# Patient Record
(demographics unavailable — no encounter records)

---

## 2017-09-29 NOTE — PD
HPI


Chief Complaint:  Bite or Sting


Time Seen by Provider:  15:34


Travel History


International Travel<30 days:  No


Contact w/Intl Traveler<30days:  No


Traveled to known affect area:  No





History of Present Illness


HPI


Patient is 60 years old.  He's had pain and swelling and redness about the nose 

and face for 3 days.  He's not sure if it might of been a spider bite or 

different insect bite.  Pain is constant.  Wearing glasses has been painful.  He

's tried ice but it has not helped.  He reports the upper lip feels swollen.  

He has no swelling in the back of the throat.  Onset gradual.  Timing constant.

  No Fever.





PFSH


Past Medical History


Cardiovascular Problems:  Yes


Diabetes:  Yes





Social History


Tobacco Use:  No





Allergies-Medications


(Allergen,Severity, Reaction):  


Coded Allergies:  


     No Known Allergies (Unverified , 9/29/17)


Reported Meds & Prescriptions





Reported Meds & Active Scripts


Active


Lortab (Hydrocodone-Acetaminophen) 5-325 Mg Tab 1-2 Tab PO Q6H PRN


Clindamycin (Clindamycin HCl) 150 Mg Cap 450 Mg PO Q8HR 10 Days








Review of Systems


Except as stated in HPI:  all other systems reviewed are Neg





Physical Exam


Narrative


GENERAL: Well-nourished well-developed 60-year-old male no acute distress


SKIN: Focused skin assessment warm/dry.


HEAD: Atraumatic. Normocephalic. 


EYES: Pupils equal and round. No scleral icterus. No injection or drainage. 


ENT: No nasal bleeding or discharge.  Mucous membranes pink and moist.  There 

is generalized erythema swelling and induration and tenderness overlying the 

nose with some extension to the maxillary face and the left side.  There is no 

proptosis or pain with range of motion of the eyes.  There is a minute at 

ulceration overlying the anterior and inferior margin of the left nostril.  

There is no fluctuance.


NECK: Trachea midline. No JVD. 


CARDIOVASCULAR: Regular rate and rhythm.  No murmur appreciated.


RESPIRATORY: No accessory muscle use. Clear to auscultation. Breath sounds 

equal bilaterally. 


GASTROINTESTINAL: Abdomen soft, non-tender, nondistended. Hepatic and splenic 

margins not palpable. 


MUSCULOSKELETAL: No obvious deformities. No clubbing.  No cyanosis.  No edema. 


NEUROLOGICAL: Awake and alert. No obvious cranial nerve deficits.  Motor 

grossly within normal limits. Normal speech.


PSYCHIATRIC: Appropriate mood and affect; insight and judgment normal.





Data


Data


Last Documented VS





Vital Signs








  Date Time  Temp Pulse Resp B/P (MAP) Pulse Ox O2 Delivery O2 Flow Rate FiO2


 


9/29/17 16:20        


 


9/29/17 14:23 98.0 110 18  95 Room Air  








Orders





 Orders


Clindamycin (Cleocin) (9/29/17 16:00)


Acetamin-Hydrocod 325-5 Mg (Norco  5-325 (9/29/17 16:00)








MDM


Medical Decision Making


Medical Screen Exam Complete:  Yes


Emergency Medical Condition:  Yes


Medical Record Reviewed:  Yes


Differential Diagnosis


Cellulitis, insect bite, abscess


Narrative Course


Overall there appears to be an infected insect bite involving the nose and face


We will provide clindamycin prescription


Toxic envenomation with a brown recluse is possibility however considered much 

less likely.


Return precautions were discussed.  Patient ready for discharge





Diagnosis





 Primary Impression:  


 Facial cellulitis


Referrals:  


Primary Care Physician


2 days





***Additional Instructions:  


You have a choice when it comes to health care, and we are glad that you chose 

[facility]. Hopefully, we have met your expectations on today's visit.  You 

are welcome to return to [facility] at any time, as we are committed to 

meeting the health care needs of our community.


***Med/Other Pt SpecificInfo:  Prescription(s) given


Scripts


Hydrocodone-Acetaminophen (Lortab) 5-325 Mg Tab


1-2 TAB PO Q6H Y for PAIN SCALE 6 TO 10, #20 TAB 0 Refills


   Prov: Siva Paris MD         9/29/17 


Clindamycin (Clindamycin) 150 Mg Cap


450 MG PO Q8HR for Infection for 10 Days, CAP 0 Refills


   Prov: Siva Paris MD         9/29/17


Disposition:  01 DISCHARGE HOME


Condition:  Stable











Siva Paris MD Sep 29, 2017 15:56

## 2018-01-05 NOTE — RADRPT
EXAM DATE/TIME:  01/05/2018 16:03 

 

[facility] COMPARISON:     

No previous studies available for comparison.

 

                     

INDICATIONS :     

Evaluate for foreign body, stepped on nail.

                     

 

MEDICAL HISTORY :     

None.          

 

SURGICAL HISTORY :     

None.   

 

ENCOUNTER:     

Initial                                        

 

ACUITY:     

3 days      

 

PAIN SCORE:     

9/10

 

LOCATION:     

Left  foot, second digit

 

FINDINGS:     

Three view examination of the left foot demonstrates no soft tissue swelling, dislocation, or fractur
e.   The tarsal bones appear intact.  The interphalangeal and metatarsophalangeal joints are intact. 
 The calcaneus is intact.  Bony mineralization is normal.

 

CONCLUSION:     No radiopaque foreign body.  Extensive vascular digital artery calcifications.  

 

 

 Fernie Hardy MD FACR on January 05, 2018 at 16:20           

Board Certified Radiologist.

 This report was verified electronically.

## 2018-01-05 NOTE — PD
HPI


Chief Complaint:  Skin Problem


Time Seen by Provider:  15:49


Travel History


International Travel<30 days:  No


Contact w/Intl Traveler<30days:  No


Traveled to known affect area:  No





History of Present Illness


HPI


60-year-old male with history of insulin-dependent diabetes presents for 

evaluation of left foot redness, pain.  He reports that 4 days ago he stepped 

on the nail and the nail went through his shoes.  He sustained a puncture wound 

on the plantar aspect left second toe.  He reports that since then he has had 

progressive increasing redness, pain in left foot.  He was seen at an urgent 

care center yesterday where he was given a tetanus vaccination and started on 

Bactrim.  His symptoms have since progressed and he was seen today by his 

primary care physician, Dr. Leon in [place], who sent him here for further 

evaluation.  Pain is throbbing, constant, worse when standing.  Denies fevers, 

chills, nausea, vomiting.  No other complaints.





PFSH


Past Medical History


Cardiovascular Problems:  Yes (stents)


Diabetes:  Yes


Diminished Hearing:  No


Hypertension:  Yes





Past Surgical History


Appendectomy:  Yes





Social History


Alcohol Use:  No


Tobacco Use:  No


Substance Use:  No





Allergies-Medications


(Allergen,Severity, Reaction):  


Coded Allergies:  


     No Known Allergies (Unverified  Adverse Reaction, Unknown, 1/5/18)


Reported Meds & Prescriptions





Reported Meds & Active Scripts


Active


Reported


Lisinopril 5 Mg Tab 5 Mg PO DAILY


Gabapentin 600 Mg Tab 600 Mg PO TID


Metformin (Metformin HCl) 1,000 Mg Tab 1,000 Mg PO BIDPC


Lantus Inj (Insulin Glargine) 1,000 Unit/10 Ml Vial 40 Units SQ HS








Review of Systems


Except as stated in HPI:  all other systems reviewed are Neg





Physical Exam


Narrative


GENERAL: Well-nourished male in no acute distress


SKIN: Warm and dry.  Area of excoriation and puncture wound on the plantar 

aspect left second toe.  There is some purulent drainage when palpated.  Wound 

culture performed.  There is erythema along the dorsal surface of the left foot 

calf region.  No inguinal lymphadenopathy.


HEAD: Atraumatic. Normocephalic. 


EYES: Pupils equal and round. No scleral icterus. No injection or drainage. 


ENT: No nasal bleeding or discharge.  Mucous membranes pink and moist.


NECK: Trachea midline. No JVD. 


CARDIOVASCULAR: Regular rate and rhythm.  No murmur appreciated.


RESPIRATORY: No accessory muscle use. Clear to auscultation. Breath sounds 

equal bilaterally. 


GASTROINTESTINAL: Abdomen soft, non-tender, nondistended. Hepatic and splenic 

margins not palpable. 


MUSCULOSKELETAL: No obvious deformities. No clubbing.  No cyanosis.  No edema. 


NEUROLOGICAL: Awake and alert. No obvious cranial nerve deficits.  Motor 

grossly within normal limits. Normal speech.


PSYCHIATRIC: Appropriate mood and affect; insight and judgment normal.





Data


Data


Last Documented VS





Vital Signs








  Date Time  Temp Pulse Resp B/P (MAP) Pulse Ox O2 Delivery O2 Flow Rate FiO2


 


1/5/18 16:35 98.9 92 17 178/92 (120) 100 Room Air  








Orders





 Orders


Foot, Complete ([id number]) (1/5/18 )


Wound Culture And Gram Stain (1/5/18 15:53)


Basic Metabolic Panel (Bmp) (1/5/18 15:53)


Complete Blood Count With Diff (1/5/18 15:53)


Prothrombin Time / Inr (Pt) (1/5/18 15:53)


Act Partial Throm Time (Ptt) (1/5/18 15:53)


Sodium Chlor 0.9% 1000 Ml Inj (Ns 1000 M (1/5/18 15:53)


Sepsis Workup Initiated (1/5/18 )


Lactic Acid Sepsis Protocol (1/5/18 15:53)


Blood Culture (1/5/18 15:53)


Piperacil-Tazo 3.375 Gm Premix (Zosyn 3. (1/5/18 16:00)


Vancomycin Inj (Vancomycin Inj) (1/5/18 16:00)


Sodium Chlor 0.9% 1000 Ml Inj (Ns 1000 M (1/5/18 17:12)


Insulin Human Regular Inj (Novolin R Inj (1/5/18 17:15)





Labs





Laboratory Tests








Test


  1/5/18


16:00


 


White Blood Count 14.0 TH/MM3 


 


Red Blood Count 4.62 MIL/MM3 


 


Hemoglobin 15.0 GM/DL 


 


Hematocrit 42.8 % 


 


Mean Corpuscular Volume 92.7 FL 


 


Mean Corpuscular Hemoglobin 32.5 PG 


 


Mean Corpuscular Hemoglobin


Concent 35.0 % 


 


 


Red Cell Distribution Width 12.6 % 


 


Platelet Count 227 TH/MM3 


 


Mean Platelet Volume 8.2 FL 


 


Neutrophils (%) (Auto) 73.2 % 


 


Lymphocytes (%) (Auto) 18.8 % 


 


Monocytes (%) (Auto) 7.6 % 


 


Eosinophils (%) (Auto) 0.2 % 


 


Basophils (%) (Auto) 0.2 % 


 


Neutrophils # (Auto) 10.3 TH/MM3 


 


Lymphocytes # (Auto) 2.6 TH/MM3 


 


Monocytes # (Auto) 1.1 TH/MM3 


 


Eosinophils # (Auto) 0.0 TH/MM3 


 


Basophils # (Auto) 0.0 TH/MM3 


 


CBC Comment DIFF FINAL 


 


Differential Comment  


 


Prothrombin Time 9.7 SEC 


 


Prothromb Time International


Ratio 1.0 RATIO 


 


 


Activated Partial


Thromboplast Time 27.4 SEC 


 


 


Blood Urea Nitrogen 8 MG/DL 


 


Creatinine 0.80 MG/DL 


 


Random Glucose 411 MG/DL 


 


Calcium Level 9.6 MG/DL 


 


Sodium Level 129 MEQ/L 


 


Potassium Level 4.3 MEQ/L 


 


Chloride Level 94 MEQ/L 


 


Carbon Dioxide Level 26.8 MEQ/L 


 


Anion Gap 8 MEQ/L 


 


Estimat Glomerular Filtration


Rate 99 ML/MIN 


 


 


Lactic Acid Level 2.4 mmol/L 











[facility]


Medical Decision Making


Medical Screen Exam Complete:  Yes


Emergency Medical Condition:  Yes


Medical Record Reviewed:  Yes


Differential Diagnosis


Cellulitis, lymphangitis, abscess, necrotizing fasciitis, osteomyelitis


Narrative Course


The patient has cellulitis of left foot and leg which is progressing despite 

outpatient therapy started yesterday.  The patient will be given IV vancomycin, 

Zosyn, fluids.  X-ray of left foot has been performed.  Wound culture is been 

performed.  Lab work, blood cultures have been sent.





CBC reveals WBC count of 14, BMP reveals a glucose of 411 with resultant sodium 

129.  Lactic acid is 2.4.  The patient will be given additional IV fluids, 

bolus insulin, he will be admitted.





Diagnosis





 Primary Impression:  


 Cellulitis of left foot


 Additional Impression:  


 Hyperglycemia





Admitting Information


Admitting Physician Requests:  Admit











Derrick Abad Jan 5, 2018 15:59

## 2018-01-05 NOTE — HHI.HP
__________________________________________________





H[facility]


[facility]


[facility]


Primary [facility] Physician


No Primary Care Physician


Admission Diagnosis





cellulitis left foot, hyperglycemia


Diagnoses:  


(1) Cellulitis of left foot


Diagnosis:  Principal





(2) Diabetes


Diagnosis:  Principal





(3) HTN (hypertension)


Diagnosis:  Secondary





Chief Complaint:  


Stepped on a nail, left foot infection.


Travel History


International Travel<30 Days:  No


Contact w/Intl Traveler <30 Da:  No


Traveled to Known Affected Are:  No





Sepsis Criteria


SIRS Criteria (2 or more):  Heart rate over 90, WBC > 37040, < 4000 or > 10% 

bands


Sepsis Criteria (SIRS+source):  Infect source susp/known


Septic Shock Criteria:  Lactic acid >=4


Criteria Outcome:  Meets SIRS criteria, Meets sepsis criteria


History of Present Illness


60-year-old  male with past medical history significant for HTN, DM, 

neuropathy, and MI presents to the emergency department after being instructed 

by his PCP following left foot injury.  Patient reports that Tuesday while at 

work tried to take off his shoe and noticed he was having trouble to remove it, 

he then noticed blood on the sock and after inspecting his foot noticed an 

injury to his toe. He reports cleaning it with alcohol and then left a trip to 

[place]. He returned the next day as he was worried about his foot.  He visited 

an urgent care center yesterday where he was administered a tetanus shot, as 

well as another IM antibiotic which he is unsure of. He was prescribed Bactrim 

and Keflex with orders for imaging as out patient.  He reports taking the 

antibiotics this morning around 4 AM which made him feel nauseous, no vomiting. 

Earlier today he visited his PCP and was instructed to come to ED ASAP or 

evaluation. He endorses  headaches today, denies fevers, chills, vomiting, 

diarrhea. Denies SOB, cough, denies any calf pain. Reports pain on left foot 

toe which radiates up the foot. Rates pain 8/10, moving the foot will make pain 

worse.





Review of Systems


Constitutional:  COMPLAINS OF: Weight loss, DENIES: Diaphoretic episodes, 

Fatigue, Fever, Weight gain, Chills


Endocrine:  DENIES: Heat/cold intolerance, Polydipsia, Polyuria, Polyphagia


Eyes:  DENIES: Blurred vision, Diplopia, Eye inflammation, Eye pain, Vision loss


Ears, nose, mouth, throat:  DENIES: Tinnitus, Hearing loss, Vertigo, Nasal 

discharge


Respiratory:  DENIES: Apneas, Cough, Snoring, Wheezing, Hemoptysis


Cardiovascular:  DENIES: Chest pain, Palpitations, Syncope, Dyspnea on Exertion


Gastrointestinal:  COMPLAINS OF: Nausea, DENIES: Abdominal pain, Black stools, 

Bloody stools


Musculoskeletal:  DENIES: Joint pain, Stiffness


Integumentary:  COMPLAINS OF: Abnormal pigmentation, Rash, DENIES: Nail changes

, Pruritus


Hematologic/lymphatic:  DENIES: Bruising, Lymphadenopathy


Immunologic/allergic:  DENIES: Eczema, Urticaria


Neurologic:  DENIES: Abnormal gait, Headache, Localized weakness, Paresthesias, 

Seizures, Speech Problems


Psychiatric:  DENIES: Anxiety, Confusion, Mood changes, Depression, Delusions


Except as stated in HPI:  all other systems reviewed are Neg





Past Family Social History


Past Medical History


DM II


Neuropathy


HTN


GERD


MI with stenting X2, one in [place] and one in [facility] around 


Past Surgical History


Right leg knee fracture with reconstruction


Left knee meniscal removal


Appendectomy


Left shoulder surgery


Reported Medications





Reported Meds & Active Scripts


Active


Reported


Lisinopril 5 Mg Tab 5 Mg PO DAILY


Gabapentin 600 Mg Tab 600 Mg PO TID


Metformin (Metformin HCl) 1,000 Mg Tab 1,000 Mg PO BIDPC


Lantus Inj (Insulin Glargine) 1,000 Unit/10 Ml Vial 40 Units SQ HS


Allergies:  


Coded Allergies:  


     No Known Allergies (Unverified  Allergy, Unknown, 18)


Active Ordered Medications





Current Medications


Sodium Chloride 1,000 ml @  1,000 mls/hr Q1H IV  Last administered on 18at 

17:25;  Start 18 at 15:53;  Stop 18 at 16:52;  Status DC


Piperacillin Sod/ Tazobactam Sod 50 ml @  100 mls/hr ONCE  ONCE IV  Last 

administered on 18at 16:23;  Start 18 at 16:00;  Stop 18 at 16:29;  

Status DC


Vancomycin HCl 1000 mg/Sodium Chloride 250 ml @  250 mls/hr ONCE  ONCE IV  Last 

administered on 18at 16:34;  Start 18 at 16:00;  Stop 18 at 16:59;  

Status DC


Sodium Chloride 1,000 ml @  1,000 mls/hr Q1H IV  Last administered on 18at 

17:25;  Start 18 at 17:12;  Stop 18 at 18:11;  Status DC


Insulin Human Regular (NovoLIN R INJ) 10 units ONCE  ONCE IV PUSH  Last 

administered on 18at 17:25;  Start 18 at 17:15;  Stop 18 at 17:17;  

Status DC


Sodium Chloride (NS Flush) 2 ml UNSCH  PRN IV FLUSH FLUSH AFTER USING IV ACCESS

;  Start 18 at 18:30;  Status UNV


Sodium Chloride (NS Flush) 2 ml BID IV FLUSH ;  Start 18 at 21:00;  Status 

UNV


Acetaminophen (Tylenol) 650 mg Q4H  PRN PO TEMP > 100.4;  Start 18 at 18:30

;  Status UNV


Ondansetron HCl (Zofran Inj) 4 mg Q6H  PRN IVP NAUSEA OR VOMITING;  Start  at 18:30


Acetaminophen/ Hydrocodone Bitart (Norco  5-325 Mg) 1 tab Q4H  PRN PO PAIN 

SCALE 3 TO 5;  Start 18 at 18:30;  Status UNV


Acetaminophen/ Hydrocodone Bitart (Norco  7.5-325 Mg) 1 tab Q4H  PRN PO PAIN 

SCALE 6 TO 10;  Start 18 at 18:30;  Status UNV


Morphine Sulfate (Morphine Inj) 2 mg Q3H  PRN IV PUSH Pain 3-5; if unable to 

take PO;  Start 18 at 18:30;  Status UNV


Naloxone HCl (Narcan Inj) 0.4 mg UNSCH  PRN IV PUSH SEE LABEL COMMENTS;  Start  at 18:30;  Status UNV


Senna/Docusate Sodium (Meredith-Colace) 1 tab BID PO ;  Start 18 at 21:00;  

Status UNV


Magnesium Hydroxide (Milk Of Magnesia Liq) 30 ml Q12H  PRN PO Mild constipation

;  Start 18 at 18:30;  Status UNV


Sennosides (Senokot) 17.2 mg Q12H  PRN PO Moderate constipation;  Start 18 

at 18:30;  Status UNV


Bisacodyl (Dulcolax Supp) 10 mg DAILY  PRN RECTAL SEVERE CONSITIPATION;  Start  at 18:30


Lactulose (Lactulose Liq) 30 ml DAILY  PRN PO SEVERE CONSITIPATION;  Start  at 18:30


Vancomycin HCl 1000 mg/Sodium Chloride 250 ml @  250 mls/hr Q12H IV ;  Start  at 04:30;  Status UNV


Pharmacy Profile Note 0 ml @ 0 mls/hr UNSCH OTHER ;  Start 18 at 18:45;  

Status UNV


Piperacillin Sod/ Tazobactam Sod 50 ml @  100 mls/hr Q12H IV ;  Start 18 at 

04:00


Gabapentin (Neurontin) 600 mg TID PO ;  Start 18 at 09:00;  Status UNV


Lisinopril (Prinivil) 5 mg DAILY PO ;  Start 18 at 09:00;  Status UNV


Insulin Detemir (Levemir Inj) 30 units HS SQ ;  Start 18 at 21:00;  Status 

UNV


Insulin Aspart (NovoLOG SUPPLEMENTAL SCALE) 1 ACHS SLIDING  SCALE SQ ;  Start 18 at 21:00;  Status UNV


Piperacillin Sod/ Tazobactam Sod 50 ml @  100 mls/hr Q12H IV ;  Start 18 at 

04:00;  Stop 18 at 04:00;  Status DC


Piperacillin Sod/ Tazobactam Sod 50 ml @  100 mls/hr Q12H IV ;  Start 18 at 

22:00;  Status UNV


Family History


Father:MI


Mother: Stroke


Social History


Tobacco: quit 7 years ago, use to smoke 1/3 PPD on and off


Alcohol: socially


Illicit drug use: denies





Physical Exam


Vital Signs





Vital Signs








  Date Time  Temp Pulse Resp B/P (MAP) Pulse Ox O2 Delivery O2 Flow Rate FiO2


 


18 16:35 98.9 92 17 178/92 (120) 100 Room Air  


 


18 14:46 97.9 100 20 235/102 (146) 94 Room Air  








Physical Exam


GENERAL: This is a well-nourished, well-developed patient, in no apparent 

distress.


SKIN: No rashes. Cool and dry.  Left foot second toe with plantar aspect wound, 

ecchymotic area on lateral second toe.  Foot with edema noted, redness along 

the dorsal aspect of foot, erythematous streaking up left inner leg.


HEAD: Atraumatic. Normocephalic. No temporal or scalp tenderness.


EYES: Pupils equal round and reactive. Extraocular motions intact. No scleral 

icterus. No injection or drainage. 


ENT: Nose without bleeding, purulent drainage or septal hematoma. Throat 

without erythema. Uvula midline. Airway patent.


NECK: Trachea midline. Supple, nontender, no meningeal signs.


CARDIOVASCULAR: Regular rate and rhythm without murmurs, gallops, or rubs. 


RESPIRATORY: Clear to auscultation. Breath sounds equal bilaterally. No wheezes

, rales, or rhonchi.  


GASTROINTESTINAL: Abdomen soft, non-tender, nondistended. No palpable masses. 

No guarding.


MUSCULOSKELETAL: Left foot with edema and erythema, redness.  No cyanosis. No 

joint tenderness, effusion noted. No calf tenderness. 


NEUROLOGICAL: Awake and alert. Cranial nerves II through XII intact.  Motor and 

sensory grossly within normal limits. Five out of 5 muscle strength in all 

muscle groups.  Normal speech.


Laboratory





Laboratory Tests








Test


  18


16:00


 


White Blood Count 14.0 


 


Red Blood Count 4.62 


 


Hemoglobin 15.0 


 


Hematocrit 42.8 


 


Mean Corpuscular Volume 92.7 


 


Mean Corpuscular Hemoglobin 32.5 


 


Mean Corpuscular Hemoglobin


Concent 35.0 


 


 


Red Cell Distribution Width 12.6 


 


Platelet Count 227 


 


Mean Platelet Volume 8.2 


 


Neutrophils (%) (Auto) 73.2 


 


Lymphocytes (%) (Auto) 18.8 


 


Monocytes (%) (Auto) 7.6 


 


Eosinophils (%) (Auto) 0.2 


 


Basophils (%) (Auto) 0.2 


 


Neutrophils # (Auto) 10.3 


 


Lymphocytes # (Auto) 2.6 


 


Monocytes # (Auto) 1.1 


 


Eosinophils # (Auto) 0.0 


 


Basophils # (Auto) 0.0 


 


CBC Comment DIFF FINAL 


 


Differential Comment  


 


Prothrombin Time 9.7 


 


Prothromb Time International


Ratio 1.0 


 


 


Activated Partial


Thromboplast Time 27.4 


 


 


Blood Urea Nitrogen 8 


 


Creatinine 0.80 


 


Random Glucose 411 


 


Calcium Level 9.6 


 


Sodium Level 129 


 


Potassium Level 4.3 


 


Chloride Level 94 


 


Carbon Dioxide Level 26.8 


 


Anion Gap 8 


 


Estimat Glomerular Filtration


Rate 99 


 


 


Lactic Acid Level 2.4 














 Date/Time


Source Procedure


Growth Status


 


 


 18 16:10


Blood Peripheral Aerobic Blood Culture


Pending Received


 


 18 16:10


Blood Peripheral Anaerobic Blood Culture


Pending Received





 18 16:00


Wound Foot Gram Stain


Pending Received


 


 18 16:00


Wound Foot Wound Culture


Pending Received








Result Diagram:  


18 1600                                                                    

            18 1600





Imaging





Last Impressions








Foot X-Ray 18 0000 Signed





Impressions: 





 Service Date/Time:  2018 16:03 - CONCLUSION: No radiopaque 





 foreign body.  Extensive vascular digital artery calcifications.      Fernie Hardy MD  FACR











Caprini VTE Risk Assessment


Caprini VTE Risk Assessment:  No/Low Risk (score <= 1)


Caprini Risk Assessment Model











 Point Value = 1          Point Value = 2  Point Value = 3  Point Value = 5


 


Age 41-60


Minor surgery


BMI > 25 kg/m2


Swollen legs


Varicose veins


Pregnancy or postpartum


History of unexplained or recurrent


   spontaneous 


Oral contraceptives or hormone


   replacement


Sepsis (< 1 month)


Serious lung disease, including


   pneumonia (< 1 month)


Abnormal pulmonary function


Acute myocardial infarction


Congestive heart failure (< 1 month)


History of inflammatory bowel disease


Medical patient at bed rest Age 61-74


Arthroscopic surgery


Major open surgery (> 45 min)


Laparoscopic surgery (> 45 min)


Malignancy


Confined to bed (> 72 hours)


Immobilizing plaster cast


Central venous access Age >= 75


History of VTE


Family history of VTE


Factor V Leiden


Prothrombin 84853L


Lupus anticoagulant


Anticardiolipin antibodies


Elevated serum homocysteine


Heparin-induced thrombocytopenia


Other congenital or acquired


   thrombophilia Stroke (< 1 month)


Elective arthroplasty


Hip, pelvis, or leg fracture


Acute spinal cord injury (< 1 month)








Prophylaxis Regimen











   Total Risk


Factor Score Risk Level Prophylaxis Regimen


 


0-1      Low Early ambulation


 


2 Moderate Order ONE of the following:


*Sequential Compression Device (SCD)


*Heparin 5000 units SQ BID


 


3-4 Higher Order ONE of the following medications:


*Heparin 5000 units SQ TID


*Enoxaparin/Lovenox 40 mg SQ daily (WT < 150 kg, CrCl > 30 mL/min)


*Enoxaparin/Lovenox 30 mg SQ daily (WT < 150 kg, CrCl > 10-29 mL/min)


*Enoxaparin/Lovenox 30 mg SQ BID (WT < 150 kg, CrCl > 30 mL/min)


AND/OR


*Sequential Compression Device (SCD)


 


5 or more Highest Order ONE of the following medications:


*Heparin 5000 units SQ TID (Preferred with Epidurals)


*Enoxaparin/Lovenox 40 mg SQ daily (WT < 150 kg, CrCl > 30 mL/min)


*Enoxaparin/Lovenox 30 mg SQ daily (WT < 150 kg, CrCl > 10-29 mL/min)


*Enoxaparin/Lovenox 30 mg SQ BID (WT < 150 kg, CrCl > 30 mL/min)


AND


*Sequential Compression Device (SCD)











Assessment and Plan


Assessment and Plan


60-year-old male with past medical history significant for  HTN, DM, neuropathy

, and MI presents to the ED following injury to the left foot after stepping on 

a nail. 








Left foot cellulitis following injury


   - WBC elevated 14.0 with neutrophils 73.2, lactic acid 2.4, tachycardic on 

admission


   - Blood cultures 2 obtained, wound culture sent, follow cultures and adjust 

antibiotics accordingly


   - Left foot x-ray performed and reviewed no radioplaque foreign body, 

extensive vascular digital total artery calcifications.


   - Patient received IV Zosyn and vancomycin while in ED


   - Received 2l NS bolus


   - Admit to inpatient with telemetry


   - Continue broad-spectrum antibiotics IV vancomycin (pharmacy consulted for 

dosing) and IV Zosyn


   - Recheck lactic acid tomorrow, recheck CBC and BMP tomorrow


   - Pain control with Norco, IV morphine for breakthrough pain





Diabetes II


   - Patient's blood sugar in , 10 units of regular insulin administered 

IV


   - Restart Levemir 30 units subcutaneous at bedtime


   - Accu-Cheks with NovoLog ISS


   - Hold off on metformin


   - Check hemoglobin A1c


   - 1800 ADA diet





Neuropathy


   - Chronic, continue patient's Neurontin 600 mg 3 times a day





Hypertension, uncontrolled


   - Initial blood pressure on admission 135/102, around 4:30 this afternoon BP 

178/92


   - Resume patient's lisinopril, continue monitoring trend








   





The exam, history, and the medical decision-making described in the above note 

were completed with the assistance of the mid-level provider. I reviewed and 

agree with the findings presented.  I attest that I had a face-to-face 

encounter with the patient on the same day, and personally performed and 

documented my assessment and findings in the medical record.


Code Status


FULL CODE


Discussed Condition With


RN AND PT AND ARNP AND ER AND FAMILY





Physician Certification


2 Midnight Certification Type:  Admission for Inpatient Services


Order for Inpatient Services


The services are ordered in accordance with Medicare regulations or non-

Medicare payer requirements, as applicable.  In the case of services not 

specified as inpatient-only, they are appropriately provided as inpatient 

services in accordance with the 2-midnight benchmark.


Estimated LOS (days):  3


 days is the estimated time the patient will need to remain in the hospital, 

assuming treatment plan goals are met and no additional complications.


Post-Hospital Plan:  Not yet determined











Rosa Shi 2018 18:08


Fernie Liu DO 2018 19:11

## 2018-01-06 NOTE — HHI.PR
Subjective


Remarks


Patient reports he is feeling better. He states the redness on his foot is 

better. He denies fevers or chills.





Objective


Vitals





Vital Signs








  Date Time  Temp Pulse Resp B/P (MAP) Pulse Ox O2 Delivery O2 Flow Rate FiO2


 


1/6/18 08:00 98.7 87 20 148/79 (102) 96   


 


1/6/18 04:42 98.4 94 18 148/94 (112) 95   


 


1/6/18 03:48  86      


 


1/6/18 03:47  70      


 


1/6/18 01:14     94   


 


1/5/18 23:43  90      


 


1/5/18 23:21 98.3 95 18 133/63 (86) 93   


 


1/5/18 21:59      Room Air  


 


1/5/18 19:52  92      


 


1/5/18 19:35 98.6 101 18 175/73 (107) 95   


 


1/5/18 16:35 98.9 92 17 178/92 (120) 100 Room Air  


 


1/5/18 14:46 97.9 100 20 235/102 (146) 94 Room Air  














I/O      


 


 1/5/18 1/5/18 1/5/18 1/6/18 1/6/18 1/6/18





 07:00 15:00 23:00 07:00 15:00 23:00


 


Intake Total   2562.5 ml 1010 ml  


 


Output Total    800 ml  


 


Balance   2562.5 ml 210 ml  


 


      


 


Intake Oral    960 ml  


 


IV Total   2562.5 ml 50 ml  


 


Output Urine Total    800 ml  


 


# Voids    1  








Result Diagram:  


1/5/18 1600                                                                    

            1/5/18 1600





Imaging





Last Impressions








Foot X-Ray 1/5/18 0000 Signed





Impressions: 





 Service Date/Time:  Friday, January 5, 2018 16:03 - CONCLUSION: No radiopaque 





 foreign body.  Extensive vascular digital artery calcifications.      Fernie Hardy MD  FACR








Objective Remarks


GENERAL: This is a well-nourished, well-developed patient, in no apparent 

distress.


CARDIOVASCULAR: Normal rate and regular rhythm without murmurs, gallops, or 

rubs. 


RESPIRATORY: Good respiratory efforts. Breath sounds equal and clear to 

auscultation bilaterally.


GASTROINTESTINAL: Abdomen soft, non-tender, non-distended. Normal active bowel 

sounds


MUSCULOSKELETAL: Left second toe has a puncture wound on the plantar aspect, 

some dry necrotic tissue. Dorsal toe with cellulitis extending to the midfoot. 


NEURO:  Alert & Oriented x4 to person, place, time, situation.  Moves all ext x4


PSYCH: Appropriate mood and affect.





A/P


Problem List:  


(1) Cellulitis of left foot


ICD Code:  L03.116 - Cellulitis of left lower limb


Status:  Acute


(2) Diabetes


ICD Code:  E11.9 - Type 2 diabetes mellitus without complications


(3) HTN (hypertension)


ICD Code:  I10 - Essential (primary) hypertension


Assessment and Plan


60-year-old male with past medical history significant for  HTN, DM, neuropathy

, and MI presents to the ED following injury to the left foot after stepping on 

a nail. 








Left foot cellulitis following puncture wound


   - WBC elevated 14.0 with neutrophils 73.2, lactic acid 2.4, tachycardic on 

admission


   - Blood cultures 2 obtained, wound culture pending, follow cultures and 

adjust antibiotics accordingly


   - Left foot x-ray performed and reviewed no radiopaque foreign body, 

extensive vascular digital total artery calcifications.


   - Continue vancomycin and Zosyn


            - Patient is a diabetic, x-ray also show small vessel 

calcifications.  He is certainly at risk for worsening infection and difficulty 

healing.  Consult podiatry for assistance.  It is unclear whether or not he 

would benefit from some debridement.  He will certainly need outpatient follow-

up.


   - Pain control with Norco, IV morphine for breakthrough pain





Diabetes II


   - Patient's blood sugar in , 10 units of regular insulin administered 

IV


   - Continue Levemir 30 units subcutaneous at bedtime


   - Accu-Cheks with NovoLog ISS


   - Hold off on metformin


   - Hemoglobin A1c of 10.4


   - 1800 ADA diet





Neuropathy


   - Chronic, continue patient's Neurontin 600 mg 3 times a day





Hypertension, uncontrolled


   -Given uncontrolled A1c, concern about compliance


   -Continue lisinopril, increase to 10 mg daily.  Continue to monitor trend





GI prophylaxis: Stool softener PRN constipation. 





DVT PPx: Davida Wheeler MD Jan 6, 2018 09:59

## 2018-01-07 NOTE — HHI.PR
Subjective


Remarks


Patient reports he is doing okay.  Still have some redness.  Pain is controlled.





Objective


Vitals





Vital Signs








  Date Time  Temp Pulse Resp B/P (MAP) Pulse Ox O2 Delivery O2 Flow Rate FiO2


 


1/7/18 12:00 98.4 90 20 169/82 (111) 94   


 


1/7/18 08:00      Room Air  


 


1/7/18 08:00 98.0 80 20 133/74 (93) 97   


 


1/7/18 04:00  80      


 


1/7/18 04:00 98.5 86 17 125/92 (103) 95   


 


1/7/18 00:00 98.6 87 17 127/90 (102) 96   


 


1/7/18 00:00      Room Air  


 


1/7/18 00:00  75      


 


1/6/18 21:00      Room Air  


 


1/6/18 20:00 98.0 86 17 127/95 (106) 95   


 


1/6/18 20:00  85      


 


1/6/18 16:00 97.8 87 20 148/78 (101) 96   


 


1/6/18 15:58  87      














I/O      


 


 1/6/18 1/6/18 1/6/18 1/7/18 1/7/18 1/7/18





 07:00 15:00 23:00 07:00 15:00 23:00


 


Intake Total 1010 ml  720 ml 240 ml  


 


Output Total 800 ml  300 ml   


 


Balance 210 ml  420 ml 240 ml  


 


      


 


Intake Oral 960 ml  720 ml 240 ml  


 


IV Total 50 ml     


 


Output Urine Total 800 ml  300 ml   


 


# Voids 1  4 1  


 


# Bowel Movements    0  








Result Diagram:  


1/7/18 0640                                                                    

            1/7/18 0540





Objective Remarks


GENERAL: This is a well-nourished, well-developed patient, in no apparent 

distress.


CARDIOVASCULAR: Normal rate and regular rhythm without murmurs, gallops, or 

rubs. 


RESPIRATORY: Good respiratory efforts. Breath sounds equal and clear to 

auscultation bilaterally.


GASTROINTESTINAL: Abdomen soft, non-tender, non-distended. Normal active bowel 

sounds


MUSCULOSKELETAL: Left second toe has a puncture wound on the plantar aspect, 

some dry necrotic tissue. Dorsal toe blister with cellulitis extending to the 

midfoot. 


NEURO:  Alert & Oriented x4 to person, place, time, situation.  Moves all ext x4


PSYCH: Appropriate mood and affect.





A/P


Problem List:  


(1) Cellulitis of left foot


ICD Code:  L03.116 - Cellulitis of left lower limb


Status:  Acute


(2) Diabetes


ICD Code:  E11.9 - Type 2 diabetes mellitus without complications


(3) HTN (hypertension)


ICD Code:  I10 - Essential (primary) hypertension


Assessment and Plan


60-year-old male with past medical history significant for  HTN, DM, neuropathy

, and MI presents to the ED following injury to the left foot after stepping on 

a nail. 








Left foot cellulitis following puncture wound


   - WBC elevated 14.0 with neutrophils 73.2, lactic acid 2.4, tachycardic on 

admission


   - Blood cultures 2 obtained, wound culture pending, follow cultures and 

adjust antibiotics accordingly


   - Left foot x-ray performed and reviewed no radiopaque foreign body, 

extensive vascular digital total artery calcifications.


   - Continue vancomycin and Zosyn


            - Patient is a diabetic, x-ray also show small vessel 

calcifications.  He is certainly at risk for worsening infection and difficulty 

healing.  


            - Appreciate podiatry consult, plan for bedside debridement 

tomorrow.  Continue IV antibiotics today


   - Pain control with Norco, IV morphine for breakthrough pain





Diabetes II


   - Patient's blood sugar in , 10 units of regular insulin administered 

IV


   - Continue Levemir 30 units subcutaneous at bedtime


   - Accu-Cheks with NovoLog ISS


   - Hold off on metformin


   - Hemoglobin A1c of 10.4


   - 1800 ADA diet





Neuropathy


   - Chronic, continue patient's Neurontin 600 mg 3 times a day





Hypertension, uncontrolled


   -Given uncontrolled A1c, concern about compliance


   -Continue lisinopril, increase to 10 mg daily.  Continue to monitor trend





GI prophylaxis: Stool softener PRN constipation. 





DVT PPx: Lovenox


Discharge Planning


Pending improvement.  Continue IV antibiotics.  Bedside debridement planned 

tomorrow per podiatry.











Davida Meneses MD Jan 7, 2018 13:43

## 2018-01-07 NOTE — MB
cc:

AMARJIT FAITH LAURA

****

 

YOB: 1957

 

DATE OF CONSULTATION:  1/7/2018

 

CHIEF COMPLAINT

Left foot cellulitis and ulceration

 

HISTORY OF PRESENT ILLNESS

Mr. Faria is a pleasant 60 year-old  male with a  significant

history of diabetes and neuropathy.  The patient states that Tuesday, while at

work, he stepped on a nail at a work site.  He had to leave on a business trip

to [place], so he cleansed and bandaged it himself.  He returned from his trip

early as he had concerns over developing infection of the foot.  He saw an

urgent care doctor where he received a tetanus shot and intramuscular

antibiotic injection.   He is unsure of what it was.  He then saw his family

doctor who prescribed Bactrim and Keflex but ultimately he ended up encouraging

him to come to the emergency department for further evaluation.

 

The patient states that the foot is feeling better after being on IV

antibiotics for 24 hours and he feels that it was better, but he does continue

to have some mild discomfort.

 

PAST MEDICAL HISTORY

1. Type 2 diabetes.

2. Neuropathy.

3. Hypertension.

4. GERD.

5. Myocardial infarction with two stents.

 

PAST SURGICAL HISTORY

1. Right/left knee fracture.

2. Left knee meniscal removal.

3. Appendectomy.

4. Left shoulder surgery.

 

MEDICATIONS

Please see list.

 

ALLERGIES

NO KNOWN DRUG ALLERGIES.

 

FAMILY HISTORY

Noncontributory.

 

SOCIAL HISTORY

The patient quit smoking seven years ago.  He used to smoke 1/3 pack per day.

He drinks alcohol socially.  Lives at home with family.  Denies any illicit

drug abuse.

 

VITAL SIGNS: Temperature is 98.0 with a T-max of 98.7.  Pulse 80, respiratory

rate 20, blood pressure 133/74, pulse ox 97% O2 on room air.

 

LABORATORY DATA

White count is 9.0, down from 14.4, hemoglobin 13.1, hematocrit 36.6, platelet

count 238.  INR 1.0.  Sodium 134, potassium 3.6, chloride 99, carbon dioxide

29.0, BUN 16, creatinine 0.49, hemoglobin A1c 10.4.

 

IMAGING STUDIES

X-rays negative for any gas in the soft tissue, signs of cortical erosion or

foreign body.  There are some noted calcifications at the dorsal forefoot.

 

PHYSICAL EXAMINATION

The patient has palpable DP and PT pulses.  Capillary refill is less than 3

seconds.  Gross sensation is diminished.  The right foot is unremarkable.  The

left foot has a plantar ulceration which appears approximately 5 millimeters x

5 millimeters.  Depth is unknown at this time.  On the lateral aspect of the

second digit, there is a bulla which is approximately 1 cm x 0.5 cm.  There is

erythema circumferentially around the digit, extending to the dorsal mid foot

with warmth noted.  No drainage, no malodor.  Active range of motion to the

digits is within normal limits.

 

ASSESSMENT AND PLAN:

1. Left foot second digit stage II ulceration with cellulitis which is

improving.

2. Plan for bedside debridement tomorrow with Dr. Ray.  Nurses were

provided with instrumentation orders and consent orders.

3. Continue IV antibiotics.

4. White count continues to trend down.  Cellulitis may be a strep variety and

may take longer than average for the redness to resolve.

5. Surgical shoe ordered.  The patient can weightbear as tolerated in the

surgical shoe.

6. The patient has [employer].  While my group does accept his insurance,

I would be happy to see him as an outpatient.  I am requesting case management

help to obtain an authorization from his family doctor to avoid delay in care

at the time of discharge.

 

Thank you for the consultation.

 

 

                              _________________________________

                              Hilda MARTINEZ

D:  1/7/2018/11:03 AM

T:  1/7/2018/11:44 AM

Visit #:  [id number]

Job #:  [id number]

## 2018-01-08 NOTE — HHI.PR
Subjective


Remarks


Patient reports is feeling okay.  Foot redness is about the same.  No fevers or 

chills.





Objective


Vitals





Vital Signs








  Date Time  Temp Pulse Resp B/P (MAP) Pulse Ox O2 Delivery O2 Flow Rate FiO2


 


1/8/18 12:00 98.5 79 20 156/73 (100) 94   


 


1/8/18 09:50     95   


 


1/8/18 08:00  76      


 


1/8/18 08:00      Room Air  


 


1/8/18 08:00 98.7 84 18 146/71 (96) 95   


 


1/8/18 06:15    138/70 (92)    


 


1/8/18 04:00 98.8 88 20 162/81 (108) 93   


 


1/8/18 03:40  75      


 


1/8/18 00:00 98.0 85 20 128/71 (90) 94   


 


1/7/18 23:37  84      


 


1/7/18 20:50      Room Air  


 


1/7/18 20:00  87      


 


1/7/18 20:00 99.0 88 17 135/63 (87) 97   


 


1/7/18 16:00 97.9 87 20 146/76 (99) 95   


 


1/7/18 15:42  83      














I/O      


 


 1/7/18 1/7/18 1/7/18 1/8/18 1/8/18 1/8/18





 07:00 15:00 23:00 07:00 15:00 23:00


 


Intake Total 240 ml  1607.5 ml 907.5 ml  


 


Output Total    500 ml  


 


Balance 240 ml  1607.5 ml 407.5 ml  


 


      


 


Intake Oral 240 ml  1040 ml 340 ml  


 


IV Total   567.5 ml 567.5 ml  


 


Output Urine Total    500 ml  


 


# Voids 1  4   


 


# Bowel Movements 0  1 0  








Result Diagram:  


1/7/18 0640                                                                    

            1/7/18 0540





Objective Remarks


GENERAL: This is a well-nourished, well-developed patient, in no apparent 

distress.


CARDIOVASCULAR: Normal rate and regular rhythm without murmurs, gallops, or 

rubs. 


RESPIRATORY: Good respiratory efforts. Breath sounds equal and clear to 

auscultation bilaterally.


GASTROINTESTINAL: Abdomen soft, non-tender, non-distended. Normal active bowel 

sounds


MUSCULOSKELETAL: Left second toe has a puncture wound on the plantar aspect, 

some dry necrotic tissue. Dorsal toe blister with cellulitis extending to the 

midfoot. 


NEURO:  Alert & Oriented x4 to person, place, time, situation.  Moves all ext x4


PSYCH: Appropriate mood and affect.





A/P


Problem List:  


(1) Cellulitis of left foot


ICD Code:  L03.116 - Cellulitis of left lower limb


Status:  Acute


(2) Diabetes


ICD Code:  E11.9 - Type 2 diabetes mellitus without complications


(3) HTN (hypertension)


ICD Code:  I10 - Essential (primary) hypertension


Assessment and Plan


60-year-old male with past medical history significant for  HTN, DM, neuropathy

, and MI presents to the ED following injury to the left foot after stepping on 

a nail. 








Left foot cellulitis following puncture wound


   - WBC elevated 14.0 with neutrophils 73.2, lactic acid 2.4, tachycardic on 

admission


   - Blood cultures 2 obtained, wound culture pending, follow cultures and 

adjust antibiotics accordingly


   - Left foot x-ray performed and reviewed no radiopaque foreign body, 

extensive vascular digital total artery calcifications.


   - Continue vancomycin and Zosyn


            - Patient is a diabetic, x-ray also show small vessel 

calcifications.  He is certainly at risk for worsening infection and difficulty 

healing.  


            - Appreciate podiatry consult, plan for bedside debridement today.  

Continue IV antibiotics today


   - Pain control with Norco, IV morphine for breakthrough pain





Diabetes II


   - Patient's blood sugar in , 10 units of regular insulin administered 

IV


   - Continue Levemir 30 units subcutaneous at bedtime


   - Accu-Cheks with NovoLog ISS


   - Hold off on metformin


   - Hemoglobin A1c of 10.4


   - 1800 ADA diet





Neuropathy


   - Chronic, continue patient's Neurontin 600 mg 3 times a day





Hypertension, uncontrolled


   -Given uncontrolled A1c, concern about compliance


   -Continue lisinopril, increase to 10 mg daily.  Continue to monitor trend





GI prophylaxis: Stool softener PRN constipation. 





DVT PPx: Lovenox


Discharge Planning


Pending improvement.  Continue IV antibiotics.  Bedside debridement planned for 

today per podiatry.











Davida Meneses MD Jan 8, 2018 13:35

## 2018-01-08 NOTE — HHI.PR
Subjective


Remarks


Patient seen bedside for left foot incision and drainage. Patient states he was 

at the landfill/[place] throwing away things and he stepped on a nail. He did not 

feel the nail go through his foot 2/2 neuropathy. Patient then proceeded on a 

trip to [place] for work and he noticed his foot progressively get worse. 

Patient denies N,V,F,Ch. He states he has mild left foot pain that is well 

controlled.





Objective





Vital Signs








  Date Time  Temp Pulse Resp B/P (MAP) Pulse Ox O2 Delivery O2 Flow Rate FiO2


 


1/8/18 16:00 97.7 88 22 163/83 (109) 96   


 


1/8/18 12:00 98.5 79 20 156/73 (100) 94   


 


1/8/18 10:00   20     


 


1/8/18 09:50     95   


 


1/8/18 08:00  76      


 


1/8/18 08:00      Room Air  


 


1/8/18 08:00 98.7 84 18 146/71 (96) 95   


 


1/8/18 06:15    138/70 (92)    


 


1/8/18 04:00 98.8 88 20 162/81 (108) 93   


 


1/8/18 03:40  75      


 


1/8/18 00:00 98.0 85 20 128/71 (90) 94   


 


1/7/18 23:37  84      


 


1/7/18 20:50      Room Air  


 


1/7/18 20:00  87      


 


1/7/18 20:00 99.0 88 17 135/63 (87) 97   














I/O      


 


 1/7/18 1/7/18 1/7/18 1/8/18 1/8/18 1/8/18





 07:00 15:00 23:00 07:00 15:00 23:00


 


Intake Total 240 ml  1607.5 ml 907.5 ml  720 ml


 


Output Total    500 ml  


 


Balance 240 ml  1607.5 ml 407.5 ml  720 ml


 


      


 


Intake Oral 240 ml  1040 ml 340 ml  720 ml


 


IV Total   567.5 ml 567.5 ml  


 


Output Urine Total    500 ml  


 


# Voids 1  4   3


 


# Bowel Movements 0  1 0  








Result Diagram:  


1/7/18 0640                                                                    

            1/7/18 0540





Imaging





Last Impressions








Foot X-Ray 1/5/18 0000 Signed





Impressions: 





 Service Date/Time:  Friday, January 5, 2018 16:03 - CONCLUSION: No radiopaque 





 foreign body.  Extensive vascular digital artery calcifications.      Fernie Hardy MD  FA[facility]








Procedures


Bedside Incision and drainage performed 01/08/18


Other Results





 Laboratory Tests








Test


  1/7/18


05:40 1/7/18


06:40 1/7/18


09:50


 


Blood Urea Nitrogen 16 MG/DL   


 


Creatinine 0.49 MG/DL   


 


Random Glucose 126 MG/DL   


 


Calcium Level 8.4 MG/DL   


 


Sodium Level 134 MEQ/L   


 


Potassium Level 3.6 MEQ/L   


 


Chloride Level 99 MEQ/L   


 


Carbon Dioxide Level 29.0 MEQ/L   


 


Anion Gap 6 MEQ/L   


 


Estimat Glomerular Filtration


Rate 174 ML/MIN 


  


  


 


 


White Blood Count  9.0 TH/MM3  


 


Red Blood Count  3.97 MIL/MM3  


 


Hemoglobin  13.1 GM/DL  


 


Hematocrit  36.6 %  


 


Mean Corpuscular Volume  92.2 FL  


 


Mean Corpuscular Hemoglobin  32.9 PG  


 


Mean Corpuscular Hemoglobin


Concent 


  35.7 % 


  


 


 


Red Cell Distribution Width  12.6 %  


 


Platelet Count  238 TH/MM3  


 


Mean Platelet Volume  8.3 FL  


 


Vancomycin Level Trough   5.2 MCG/ML 








Objective Remarks


Left foot physical exam: 





Vasc: DP/PT non palpable 2/2 edema to left foot. CRT under 3 secs to digits x5 

left foot. 


Neuro: Gross sensation intact to left foot. No hyperalgesia noted. Pin point 

sensation decreased to left foot. 


Derm: Erythema noted ascending to left midfoot. Erythema noted to left second 

digit. Plantar second digit proximal phalanx ulcer with eschar present to wound 

bed. Upon debridement and incision and drainage noted purulent drainage total 

1cc with exposed tendon and probe to bone. Sanginous blister noted to dorsal 

lateral aspect of left second digit. Upon lancing of blister no purulent 

drainage noted. 


MSK: Hallux valgus noted to left foot, decrease in medial longitudinal arch, 

hammertoe noted to left second digit. Tenderness on palpation of left foot at 

MPJ.


Medications and IVs





Current Medications








 Medications


  (Trade)  Dose


 Ordered  Sig/Gianfranco


 Route  Start Time


 Stop Time Status Last Admin


 


  (NS Flush)  2 ml  UNSCH  PRN


 IV FLUSH  1/5/18 18:30


     


 


 


  (NS Flush)  2 ml  BID


 IV FLUSH  1/5/18 21:00


    1/8/18 08:46


 


 


  (Tylenol)  650 mg  Q4H  PRN


 PO  1/5/18 18:30


     


 


 


  (Zofran Inj)  4 mg  Q6H  PRN


 IVP  1/5/18 18:30


     


 


 


  (Norco  5-325 Mg)  1 tab  Q4H  PRN


 PO  1/5/18 18:30


     


 


 


  (Norco  7.5-325


 Mg)  1 tab  Q4H  PRN


 PO  1/5/18 18:30


    1/8/18 08:52


 


 


  (Morphine Inj)  2 mg  Q3H  PRN


 IV PUSH  1/5/18 18:30


     


 


 


  (Narcan Inj)  0.4 mg  UNSCH  PRN


 IV PUSH  1/5/18 18:30


     


 


 


  (Meredith-Colace)  1 tab  BID


 PO  1/5/18 21:00


    1/8/18 08:46


 


 


  (Milk Of


 Magnesia Liq)  30 ml  Q12H  PRN


 PO  1/5/18 18:30


    1/7/18 17:08


 


 


  (Senokot)  17.2 mg  Q12H  PRN


 PO  1/5/18 18:30


     


 


 


  (Dulcolax Supp)  10 mg  DAILY  PRN


 RECTAL  1/5/18 18:30


     


 


 


  (Lactulose Liq)  30 ml  DAILY  PRN


 PO  1/5/18 18:30


     


 


 


 Pharmacy Profile


 Note  0 ml @ 0


 mls/hr  UNSCH


 OTHER  1/5/18 18:45


     


 


 


 Piperacillin Sod/


 Tazobactam Sod  50 ml @ 


 100 mls/hr  Q12H


 IV  1/6/18 04:00


    1/8/18 03:52


 


 


  (Neurontin)  600 mg  TID


 PO  1/6/18 09:00


    1/8/18 17:36


 


 


  (Levemir Inj)  30 units  HS


 SQ  1/5/18 21:00


    1/7/18 21:03


 


 


  (NovoLOG


 SUPPLEMENTAL


 SCALE)  1  ACHS SLIDING  SCALE


 SQ  1/5/18 21:00


    1/8/18 17:00


 


 


  (Lovenox Inj)  40 mg  Q24H


 SQ  1/5/18 21:00


    1/7/18 20:59


 


 


  (Narcan Inj)  0.4 mg  UNSCH  PRN


 IV PUSH  1/5/18 19:15


     


 


 


  (D50w (Vial) Inj)  50 ml  UNSCH  PRN


 IV PUSH  1/5/18 19:45


     


 


 


  (Glucagon Inj)  1 mg  UNSCH  PRN


 OTHER  1/5/18 19:45


     


 


 


  (Prinivil)  10 mg  DAILY


 PO  1/7/18 09:00


    1/8/18 08:46


 


 


  (Silvadene 1%


 Cream (50 Gm))  1 applic  DAILY


 TOPICAL  1/8/18 07:00


     


 


 


 Vancomycin HCl


 1750 mg/Sodium


 Chloride  517.5 ml @ 


 250 mls/hr  Q12H


 IV  1/7/18 14:00


    1/8/18 13:52


 


 


 Miscellaneous


 Information  SPECIFIC LAB TO BE


 DRAWN:VANCOMYCIN


 TROUGH DATE TO...  ONCE  ONCE


 .XX  1/9/18 01:45


 1/9/18 01:46   


 











Assessment and Plan


Assessment and Plan


60 year old male with left second digit infection, left foot infection 2/2 stab 

wound from nail 





Patient examined and evaluated with all questions answered 


Consent signed, witnessed for left foot second digit incision and drainage 


Time out performed via hospital protocol 


Nurse bedside for part of procedure 





Procedure Note


Left foot prepped and draped in the usual sterile fashion. Betadine utilized 

for prep. 15 blade utilized to perform incision and drainage to left foot 

second digit at plantar proximal phalanx,with incision made done to 

subcutaneous tissue. 1cc purulent drainage expressed. Tendon and probe to bone 

noted. Tendon dystrophic. No purulent drainage upon continued compression along 

course of 2nd FDL or plantar aspect of metatarsal heads. Site was copiously 

irrigated with normal saline. Silvadene, 4x4s, lanny, and ace applied to left 

foot. Deep culture swab performed 





Anticipated DC per podiatry in 2 days if improvement noted with continued IV 

abx therapy and s/p bedside I&D


Will re evaluate patient tomorrow


Continue IV abx therapy











Emily Ray DPM Jan 8, 2018 19:34

## 2018-01-09 NOTE — HHI.PR
Subjective


Remarks


Patient reports is feeling okay.  Pain is controlled.  Afebrile.





Objective


Vitals





Vital Signs








  Date Time  Temp Pulse Resp B/P (MAP) Pulse Ox O2 Delivery O2 Flow Rate FiO2


 


1/9/18 08:12  76      


 


1/9/18 08:00      Room Air  


 


1/9/18 08:00 98.0 83 20 146/83 (104) 95   


 


1/9/18 04:22  78      


 


1/9/18 04:00 98.5 74 16 140/67 (91) 96   


 


1/9/18 00:00 99.5 80 18 156/78 (104) 96   


 


1/8/18 23:56  84      


 


1/8/18 20:26  85      


 


1/8/18 20:00 99.5 86 18 189/90 (123) 95   


 


1/8/18 19:00      Room Air  


 


1/8/18 16:06  77      


 


1/8/18 16:00 97.7 88 22 163/83 (109) 96   


 


1/8/18 12:00 98.5 79 20 156/73 (100) 94   














I/O      


 


 1/8/18 1/8/18 1/8/18 1/9/18 1/9/18 1/9/18





 07:00 15:00 23:00 07:00 15:00 23:00


 


Intake Total 907.5 ml  720 ml 240 ml  


 


Output Total 500 ml     


 


Balance 407.5 ml  720 ml 240 ml  


 


      


 


Intake Oral 340 ml  720 ml 240 ml  


 


IV Total 567.5 ml     


 


Output Urine Total 500 ml     


 


# Voids   3 2  


 


# Bowel Movements 0   2  








Result Diagram:  


1/7/18 0640                                                                    

            1/9/18 0200





Objective Remarks


GENERAL: This is a well-nourished, well-developed patient, in no apparent 

distress.


CARDIOVASCULAR: Normal rate and regular rhythm without murmurs, gallops, or 

rubs. 


RESPIRATORY: Good respiratory efforts. Breath sounds equal and clear to 

auscultation bilaterally.


GASTROINTESTINAL: Abdomen soft, non-tender, non-distended. Normal active bowel 

sounds


MUSCULOSKELETAL: Left foot with postop shoe. Dressing appear clean. Chronic 

diminished sensations at the toes otherwise appear pink.


NEURO:  Alert & Oriented x4 to person, place, time, situation.  Moves all ext x4


PSYCH: Appropriate mood and affect.





A/P


Problem List:  


(1) Cellulitis of left foot


ICD Code:  L03.116 - Cellulitis of left lower limb


Status:  Acute


(2) Diabetes


ICD Code:  E11.9 - Type 2 diabetes mellitus without complications


(3) HTN (hypertension)


ICD Code:  I10 - Essential (primary) hypertension


Assessment and Plan


60-year-old male with past medical history significant for  HTN, DM, neuropathy

, and MI presents to the ED following injury to the left foot after stepping on 

a nail. 








Left foot cellulitis following puncture wound


   - WBC elevated 14.0 with neutrophils 73.2, lactic acid 2.4, tachycardic on 

admission


   - Blood cultures 2 obtained, wound cultures growing group D enterococcus 

and strep, continue Zosyn.  Discontinue vancomycin


   - Left foot x-ray performed and reviewed no radiopaque foreign body, 

extensive vascular digital total artery calcifications.


            - Patient is a diabetic, x-ray also show small vessel 

calcifications.  He is certainly at risk for worsening infection and difficulty 

healing.  


            - Appreciate podiatry consult, status post bedside debridement on 1/ 8/18.


   - Continue IV antibiotics today per podiatry recommendations, Pain control 

with Norco, IV morphine for breakthrough pain





Diabetes II


   - Patient's blood sugar in , 10 units of regular insulin administered 

IV


   - Continue Levemir 30 units subcutaneous at bedtime


   - Accu-Cheks with NovoLog ISS


   - Hold off on metformin


   - Hemoglobin A1c of 10.4


   - 1800 ADA diet





Neuropathy


   - Chronic, continue patient's Neurontin 600 mg 3 times a day





Hypertension, uncontrolled


   -Given uncontrolled A1c, concern about compliance


   -Continue lisinopril, increase to 10 mg daily.  Continue to monitor trend





GI prophylaxis: Stool softener PRN constipation. 





DVT PPx: Lovenox


Discharge Planning


Pending improvement.  Continue IV antibiotics today. DC home when cleared by 

Podiatry.











Davida Meneses MD Jan 9, 2018 11:56

## 2018-01-09 NOTE — HHI.PR
Subjective


Remarks


Patient seen bedside this evening. States his appetite in intact and unchanged. 

Denies any infectious symptoms N,V,F,Ch. States the pain in his foot is 

resolving although he occasionally gets shooting and throbbing pain.





Objective





Vital Signs








  Date Time  Temp Pulse Resp B/P (MAP) Pulse Ox O2 Delivery O2 Flow Rate FiO2


 


1/9/18 17:15     97   21


 


1/9/18 16:01 98.0 84 18 187/91 (123) 97   


 


1/9/18 15:48  77      


 


1/9/18 12:00 98.7 65 20 136/68 (90) 95   


 


1/9/18 09:30   20     


 


1/9/18 08:12  76      


 


1/9/18 08:00      Room Air  


 


1/9/18 08:00 98.0 83 20 146/83 (104) 95   


 


1/9/18 04:22  78      


 


1/9/18 04:00 98.5 74 16 140/67 (91) 96   


 


1/9/18 00:00 99.5 80 18 156/78 (104) 96   


 


1/8/18 23:56  84      














I/O      


 


 1/8/18 1/8/18 1/8/18 1/9/18 1/9/18 1/9/18





 07:00 15:00 23:00 07:00 15:00 23:00


 


Intake Total 907.5 ml  720 ml 807 ml 720 ml 720 ml


 


Output Total 500 ml     


 


Balance 407.5 ml  720 ml 807 ml 720 ml 720 ml


 


      


 


Intake Oral 340 ml  720 ml 240 ml 720 ml 720 ml


 


IV Total 567.5 ml   567 ml  


 


Output Urine Total 500 ml     


 


# Voids   3 2 3 7


 


# Bowel Movements 0   2 1 0








Result Diagram:  


1/7/18 0640                                                                    

            1/9/18 0200





Procedures


Bedside Incision and drainage performed 01/08/18


Other Results





 Laboratory Tests








Test


  1/9/18


02:00


 


Creatinine 0.53 MG/DL 


 


Estimat Glomerular Filtration


Rate 159 ML/MIN 


 


 


Vancomycin Level Trough 6.8 MCG/ML 








Objective Remarks


Left foot physical exam: 





Vasc: DP/PT 2/4 left foot. CRT under 3 secs to digits x5 left foot. 


Neuro: Gross sensation intact to left foot. No hyperalgesia noted. Pin point 

sensation decreased to left foot. 


Derm: Erythema noted ascending to left midfoot resolving and receding. Erythema 

noted to left second digit. Plantar second digit proximal phalanx ulcer with 

eschar granular wound bed and exposed tendon. Deep tissue injury noted to 

dorsal lateral aspect of left second digit. 


MSK: Hallux valgus noted to left foot, decrease in medial longitudinal arch, 

hammertoe noted to left second digit. Tenderness on palpation of left foot at 

MPJ.


Medications and IVs





Current Medications








 Medications


  (Trade)  Dose


 Ordered  Sig/Gianfranco


 Route  Start Time


 Stop Time Status Last Admin


 


  (NS Flush)  2 ml  UNSCH  PRN


 IV FLUSH  1/5/18 18:30


     


 


 


  (NS Flush)  2 ml  BID


 IV FLUSH  1/5/18 21:00


    1/9/18 08:25


 


 


  (Tylenol)  650 mg  Q4H  PRN


 PO  1/5/18 18:30


     


 


 


  (Zofran Inj)  4 mg  Q6H  PRN


 IVP  1/5/18 18:30


     


 


 


  (Norco  5-325 Mg)  1 tab  Q4H  PRN


 PO  1/5/18 18:30


     


 


 


  (Norco  7.5-325


 Mg)  1 tab  Q4H  PRN


 PO  1/5/18 18:30


    1/9/18 08:30


 


 


  (Morphine Inj)  2 mg  Q3H  PRN


 IV PUSH  1/5/18 18:30


     


 


 


  (Narcan Inj)  0.4 mg  UNSCH  PRN


 IV PUSH  1/5/18 18:30


     


 


 


  (Meredith-Colace)  1 tab  BID


 PO  1/5/18 21:00


    1/9/18 08:24


 


 


  (Milk Of


 Magnesia Liq)  30 ml  Q12H  PRN


 PO  1/5/18 18:30


    1/7/18 17:08


 


 


  (Senokot)  17.2 mg  Q12H  PRN


 PO  1/5/18 18:30


     


 


 


  (Dulcolax Supp)  10 mg  DAILY  PRN


 RECTAL  1/5/18 18:30


     


 


 


  (Lactulose Liq)  30 ml  DAILY  PRN


 PO  1/5/18 18:30


     


 


 


 Piperacillin Sod/


 Tazobactam Sod  50 ml @ 


 100 mls/hr  Q12H


 IV  1/6/18 04:00


    1/9/18 16:01


 


 


  (Neurontin)  600 mg  TID


 PO  1/6/18 09:00


    1/9/18 18:04


 


 


  (Levemir Inj)  30 units  HS


 SQ  1/5/18 21:00


    1/8/18 20:26


 


 


  (NovoLOG


 SUPPLEMENTAL


 SCALE)  1  ACHS SLIDING  SCALE


 SQ  1/5/18 21:00


    1/9/18 16:04


 


 


  (Lovenox Inj)  40 mg  Q24H


 SQ  1/5/18 21:00


    1/8/18 20:26


 


 


  (Narcan Inj)  0.4 mg  UNSCH  PRN


 IV PUSH  1/5/18 19:15


     


 


 


  (D50w (Vial) Inj)  50 ml  UNSCH  PRN


 IV PUSH  1/5/18 19:45


     


 


 


  (Glucagon Inj)  1 mg  UNSCH  PRN


 OTHER  1/5/18 19:45


     


 


 


  (Prinivil)  10 mg  DAILY


 PO  1/7/18 09:00


    1/9/18 08:24


 


 


  (Silvadene 1%


 Cream (50 Gm))  1 applic  DAILY


 TOPICAL  1/8/18 07:00


     


 











Assessment and Plan


Assessment and Plan


60 year old male with left second digit infection, left foot infection 2/2 stab 

wound from nail 





Patient examined and evaluated with all questions answered 


Improvement noted to left foot however ascending erythema still present to left 

foot although receding 


Continue IV abx therapy 


Will re evaluate tomorrow


Cx 1/5 growing Strep/Enterococcus 


Deep Cx 1/8  from bedside incision and drainage pending 


ID recommendations for outpatient oral vs. IV abx therapy











Emily Ray DPM Jan 9, 2018 22:00

## 2018-01-10 NOTE — HHI.DS
__________________________________________________





Discharge Summary


Admission Date


Jan 5, 2018 at 17:48


Discharge Date:  Mathew 10, 2018


Admitting Diagnosis





cellulitis left foot, hyperglycemia





(1) Cellulitis of left foot


ICD Code:  L03.116 - Cellulitis of left lower limb


Diagnosis:  Principal


Status:  Acute


(2) Diabetes


ICD Code:  E11.9 - Type 2 diabetes mellitus without complications


Diagnosis:  Principal





(3) HTN (hypertension)


ICD Code:  I10 - Essential (primary) hypertension


Diagnosis:  Principal





(4) Hyperglycemia


ICD Code:  R73.9 - Hyperglycemia, unspecified


Diagnosis:  Principal


Status:  Acute


Brief History - From Admission


60-year-old  male with past medical history significant for HTN, DM, 

neuropathy, and MI presents to the emergency department after being instructed 

by his PCP following left foot injury.  Patient reports that Tuesday while at 

work tried to take off his shoe and noticed he was having trouble to remove it, 

he then noticed blood on the sock and after inspecting his foot noticed an 

injury to his toe. He reports cleaning it with alcohol and then left a trip to 

[place]. He returned the next day as he was worried about his foot.  He visited 

an urgent care center yesterday where he was administered a tetanus shot, as 

well as another IM antibiotic which he is unsure of. He was prescribed Bactrim 

and Keflex with orders for imaging as out patient.  He reports taking the 

antibiotics this morning around 4 AM which made him feel nauseous, no vomiting. 

Earlier today he visited his PCP and was instructed to come to ED ASAP or 

evaluation. He endorses  headaches today, denies fevers, chills, vomiting, 

diarrhea. Denies SOB, cough, denies any calf pain. Reports pain on left foot 

toe which radiates up the foot. Rates pain 8/10, moving the foot will make pain 

worse.


CBC/BMP:  


1/7/18 0640                                                                    

            1/9/18 0200





Significant Findings





Laboratory Tests








Test


  1/9/18


02:00


 


Creatinine


  0.53 MG/DL


(0.60-1.30)








Imaging





Last Impressions








Foot X-Ray 1/5/18 0000 Signed





Impressions: 





 Service Date/Time:  Friday, January 5, 2018 16:03 - CONCLUSION: No radiopaque 





 foreign body.  Extensive vascular digital artery calcifications.      Fernie Hardy MD  FACR








PE at Discharge


GENERAL: This is a well-nourished, well-developed patient, in no apparent 

distress.


CARDIOVASCULAR: Normal rate and regular rhythm without murmurs, gallops, or 

rubs. 


RESPIRATORY: Good respiratory efforts. Breath sounds equal and clear to 

auscultation bilaterally.


GASTROINTESTINAL: Abdomen soft, non-tender, non-distended. Normal active bowel 

sounds


MUSCULOSKELETAL: Left foot with postop shoe. Dressing appear clean. Chronic 

diminished sensations at the toes otherwise appear pink.


NEURO:  Alert & Oriented x4 to person, place, time, situation.  Moves all ext x4


PSYCH: Appropriate mood and affect.


Pt Condition on Discharge:  Stable


Discharge Disposition:  Discharge Home


Discharge Time:  > 30 minutes


Discharge Instructions


DIET: Follow Instructions for:  Diabetic Diet


Activities you can perform:  Weight Bearing as Jamie


Follow up Referrals:  


Podiatry - 3-5 Days @ [facility] with Hilda Villagomez DPM





New Medications:  


Insulin Aspart Inj (Novolog Inj) 1,000 Unit/10 Ml Vial


2-12 UNITS SQ ACHS for Blood Sugar Management, #10 ML 0 Refills


Max dose at bedtime ( 8) units; sugars less than 70,(0) units; sugars


 150-199,(2) units; sugars 200-249,(4) units; sugars 250-299,(7) units;


 sugars 300-349,(10) units; sugars greater than 349,(12)units


Ampicillin (Ampicillin Trihydrate) 500 Mg Capsule


500 MG PO Q8H for Infection, #30 TAB-CAP





Lisinopril (Lisinopril) 10 Mg Tab


10 MG PO DAILY for Blood Pressure Management, #31 TAB





 


Continued Medications:  


Gabapentin (Gabapentin) 600 Mg Tab


600 MG PO TID, #90 TAB 0 Refills





Insulin Glargine Inj (Lantus Inj) 1,000 Unit/10 Ml Vial


40 UNITS SQ HS for Blood Sugar Management, #1 VIAL 0 Refills (This prescription 

has been renewed)





Metformin (Metformin) 1,000 Mg Tab


1000 MG PO BIDPC for Blood Sugar Management, #60 TAB 0 Refills





 


Discontinued Medications:  


Lisinopril (Lisinopril) 5 Mg Tab


5 MG PO DAILY for Blood Pressure Management, #30 TAB 0 Refills

















Schmitz Montiel,Tunde MD Mathew 10, 2018 17:52

## 2018-01-10 NOTE — MB
cc:

ADELA ALVARADO MD, FRANKLYN F. MD

****

 

 

DATE OF CONSULTATION

1/10/2018

 

REQUESTING PHYSICIAN

Dr. Schmitz.

 

REASON FOR CONSULTATION

Recommendations for outpatient antibiotics.

 

HISTORY OF PRESENT ILLNESS

This is a 60-year-old  male who stepped on a nail one week ago.  The

patient subsequently developed cellulitis and then had spread of erythema of

his left leg.  He was admitted to the hospital and was evaluated by podiatry

who did bedside debridement of the wound.  Before that he was on Keflex and

Bactrim prescribed by his family doctor.  An x-ray was performed and it showed

no erosions.  The patient is diabetic.  Culture from the wound on 1/5/2018 had

Enterococcus faecalis and group-B beta strep.  His white count on admission was

14.0 and on 1/7/2018 it was down to 9.0.  The patient has been afebrile.  He

denies nausea, vomiting, abdominal pain, shortness of breath, chills, dysuria

or other symptoms.  The redness of the foot has improved.  He still, however,

has redness at the toe and there is an ulcerated area from the debridement

procedures performed.  There were two bedside debridements performed.

 

PAST MEDICAL/SURGICAL HISTORY

1. Diabetes mellitus.

2. Diabetic neuropathy.

3. Hypertension.

4. Gastroesophageal reflux disease.

5. History of myocardial infarction.

6. History of myocardial stents.

7. Appendectomy.

8. Left shoulder surgery.

9. Left knee surgery.

10.Right knee reconstruction from a fracture.

 

ALLERGIES

No known drug allergies.

 

MEDICATIONS

1. Neurontin.

2. Prinivil.

3. Piperacillin/tazobactam.

4. Meredith-Colace.

5. Insulin.

6. Norco 7.5.

 

SOCIAL HISTORY

No tobacco.  No alcohol.  No illicit drugs.

 

FAMILY HISTORY

Noncontributory.

 

REVIEW OF SYSTEMS

Negative on 10-point review.

 

PHYSICAL EXAMINATION

GENERAL:  This is a well-developed male who is in no acute distress.  He is

awake, alert and oriented.

VITAL SIGNS:  Temperature 98.2, /89, respirations 17, heart rate 78.

HEENT:  The head is atraumatic.  Extraocular movements grossly intact.  Pupils

reactive to light.  No icterus.  Oropharynx has moist mucosa, no lesions.

NECK:  Supple.  No adenopathy.

LUNGS:  Clear.

HEART:  Regular rate and rhythm without murmurs.

ABDOMEN:  Bowel sounds present.  Soft, nontender.

RECTAL:  Not performed.

EXTREMITIES:  No clubbing.  No cyanosis.  The left second toe is erythematous

and there is ulceration at the inner aspect near the distal phalangeal joint.

There is mild redness of the dorsum of the left foot and no swelling.  There is

some bloody serous drainage on the dressing.

NEUROLOGIC:  Nonfocal.

PSYCH:  Patient calm and cooperative.

 

LABORATORY

WBC 9.0, platelets 238.  Estimated .

 

IMPRESSION

Wound infection of the left second toe with cellulitis.  This followed a nail

puncture injury.  The culture grew out Enterococcus faecalis and group-B beta

strep.

 

RECOMMENDATIONS

Continue to treat the patient with p.o. ampicillin 500 t.i.d. for 10 days.  The

patient can follow-up with podiatry upon discharge.

 

Thank you for this consultation.

 

 

 

                              _________________________________

                              En Crane MD FD/NAYAN

D:  1/10/2018/2:25 PM

T:  1/10/2018/2:59 PM

Visit #:  [id number]

Job #:  [id number]

## 2018-01-10 NOTE — HHI.IDPN
Note


Infectious Disease Note


Patient seen and examined.


Full consult dictated.





Cellulitis and wound infection of the left 2nd toe without osteomyelitis.


Enterococcus and group B strep. 





Recommend :





PO Ampicillin x 10 days. 


Okay to discharge from my standpoint. 














En Crane MD Mathew 10, 2018 14:36

## 2018-01-10 NOTE — HHI.PR
Subjective


Remarks


Patient seen bedside this evening.  Discussed possible discharge.  Improvement 

noted to the left lower extremity.  Denies any nausea vomiting fevers or chills.





Objective





Vital Signs








  Date Time  Temp Pulse Resp B/P (MAP) Pulse Ox O2 Delivery O2 Flow Rate FiO2


 


1/10/18 18:40  68      


 


1/10/18 16:01 98.3 69 18 148/83 (104) 96   


 


1/10/18 12:11 98.2 78 17 155/89 (111) 98   


 


1/10/18 12:00  69      


 


1/10/18 09:43   20     


 


1/10/18 08:36     96 Room Air  


 


1/10/18 08:01 98.8 70 17 161/80 (107) 96   


 


1/10/18 08:00  62      


 


1/10/18 04:00 98.5 72 18 150/74 (99) 97   


 


1/10/18 03:45  63      


 


1/10/18 00:00 97.5 72 18 115/74 (88) 98   


 


1/9/18 23:47  89      


 


1/9/18 20:00 99.2 84 18 181/89 (119) 96   


 


1/9/18 19:41  79      














I/O      


 


 1/9/18 1/9/18 1/9/18 1/10/18 1/10/18 1/10/18





 07:00 15:00 23:00 07:00 15:00 23:00


 


Intake Total 807 ml 720 ml 720 ml 480 ml  


 


Balance 807 ml 720 ml 720 ml 480 ml  


 


      


 


Intake Oral 240 ml 720 ml 720 ml 480 ml  


 


IV Total 567 ml     


 


# Voids 2 3 7 3  


 


# Bowel Movements 2 1 0 1  








Result Diagram:  


1/7/18 0640                                                                    

            1/9/18 0200





Imaging





Last Impressions








Foot X-Ray 1/5/18 0000 Signed





Impressions: 





 Service Date/Time:  Friday, January 5, 2018 16:03 - CONCLUSION: No radiopaque 





 foreign body.  Extensive vascular digital artery calcifications.      Fernie Hardy MD  FACR








Procedures


Bedside Incision and drainage performed 01/08/18


Other Results





 Laboratory Tests








Test


  1/9/18


02:00


 


Creatinine 0.53 MG/DL 


 


Estimat Glomerular Filtration


Rate 159 ML/MIN 


 


 


Vancomycin Level Trough 6.8 MCG/ML 








Objective Remarks


Left foot physical exam: 





Vasc: DP/PT 2/4 left foot. CRT under 3 secs to digits x5 left foot. 


Neuro: Gross sensation intact to left foot. No hyperalgesia noted. Pin point 

sensation decreased to left foot. 


Derm: Erythema noted ascending to left midfoot resolving and receding improved 

from 1/9. Erythema noted to left second digit. Plantar second digit proximal 

phalanx ulcer with eschar granular wound bed and exposed tendon. Deep tissue 

injury noted to dorsal lateral aspect of left second digit. 


MSK: Hallux valgus noted to left foot, decrease in medial longitudinal arch, 

hammertoe noted to left second digit. Tenderness on palpation of left foot at 

MPJ.





Assessment and Plan


Assessment and Plan


60 year old male with left second digit infection, left foot infection 2/2 stab 

wound from nail 





Patient examined and evaluated with all questions answered 


Okay to DC per podiatry


ID recommendations for oral outpatient recommendations appreciated


Improvement noted to left foot  


Patient to follow up in office with me, Dr. Ray within 1 week


Discussed appropriate dressing changes with patient to consist of Silvadene 

with dry sterile dressing to left second digit daily











Emily Ray DPM Mathwe 10, 2018 19:20

## 2018-01-10 NOTE — HHI.DCPOC
Discharge Care Plan


Diagnosis:  


(1) Hyperglycemia


(2) Cellulitis of left foot


(3) Diabetes


(4) HTN (hypertension)


Goals to Promote Your Health


* To prevent worsening of your condition and complications


* To maintain your health at the optimal level


Directions to Meet Your Goals


*** Take your medications as prescribed


*** Follow your dietary instruction


*** Follow activity as directed








*** Keep your appointments as scheduled


*** Take your immunizations and boosters as scheduled


*** If your symptoms worsen call your PCP, if no PCP go to Urgent Care Center 

or Emergency Room***


*** Smoking is Dangerous to Your Health. Avoid second hand smoke***


***Call the 24-hour hour crisis hotline for domestic abuse at [phone]***











Tunde Alvares MD Mathew 10, 2018 17:49

## 2018-01-11 NOTE — PQ
Physician Query Response Document

 

 

PATIENT:               TUSHAR GHOTRA

ACCT #:                  [id number]

MRN:                       [id number]

:                       1957

ADMIT DATE:       2018 5:48 PM

DISCH DATE:        1/10/2018 6:46 PM

RESPONDING

PROVIDER #:        [id number]

 

 

QUERY TEXT:

 

Documentation Clarification

 

Your help is requested in clarifying the following clinical documentation, if you can please further 
specify in the medical record and discharge summary.

 

Thank You!

 

The patient's Clinical Indicators include:

The H

However, this is not stated in the problem list.

 

Sepsis Criteria

SIRS Criteria (2 or more):  Heart rate over 90, WBC > 13775, < 4000 or > 10% bands

Sepsis Criteria (SIRS+source):  Infect source susp/known

Septic Shock Criteria:  Lactic acid >=4

Criteria Outcome:  Meets SIRS criteria, Meets sepsis criteria

 

Presenting clinical indicators for Sepsis are:

Heart Rate : 100

Respiratory rate:20

WBC:14.0

Lactic acid: 2.4

 

 

 

 

Query created by: Roselia Flores on 2018 12:00 AM

 

RESPONSE TEXT:

 

Appears to have meet SIRS/ SEPSIS CRITERIA ON ADMISSION FOR THE DM FOOT INFECTION

 

 

 

 

 

 

 

Electronically signed by:  Ferine Liu 2018 10:57 AM

## 2018-02-13 NOTE — HHI.HP
__________________________________________________





[facility]


Service


[facility]


Primary Care Physician


No Primary Care Physician


Admission Diagnosis





diabetic foot infection


Diagnoses:  


Chief Complaint:  


Foot infection


Travel History


International Travel<30 Days:  No


Contact w/Intl Traveler <30 Da:  No


Traveled to Known Affected Are:  No


History of Present Illness


60-year-old male with a medical history significant for hypertension, diabetes, 

peripheral neuropathy presented to the emergency room with worsening infection 

of the left second toe.  The patient was recently admitted for infection of the 

toe after he stepped on a nail.  He underwent debridement and was treated with 

antibiotics.  He reports that he was doing okay at home and a couple of days 

ago while pushing a boat he felt a pop in the wound opened up.  Since then he 

has been experiencing significant pain.  He denies any fevers or chills.  

Workup in the emergency room revealed probable osteomyelitis involving the 

second toe on the left foot.





Review of Systems


Constitutional:  DENIES: Fever, Chills


Musculoskeletal:  COMPLAINS OF: Joint pain, Stiffness, Joint Swelling


Except as stated in HPI:  all other systems reviewed are Neg





Past Family Social History


Past Medical History


DM II


Neuropathy


HTN


GERD


MI with stenting X2, one in [place] and one in [facility] around 


Past Surgical History


Left second toe debridement.


Right leg knee fracture with reconstruction


Left knee meniscal removal


Appendectomy


Left shoulder surgery


Allergies:  


Coded Allergies:  


     No Known Allergies (Unverified  Allergy, Unknown, 18)


Family History


Father has history of MI.


Social History


Patient reports he quit smoking 7 years ago.  Admits to alcohol occasionally.  

Denies illicit drug use.





Physical Exam


Vital Signs





Vital Signs








  Date Time  Temp Pulse Resp B/P (MAP) Pulse Ox O2 Delivery O2 Flow Rate FiO2


 


18 14:26  92 17 136/71 (92) 99 Room Air  


 


18 13:34   17     


 


18 10:34  86 20 142/75 (97) 98 Room Air  


 


18 09:31 98.2 82 18 190/101 (130) 98 Room Air  








Physical Exam


GENERAL: This is a well-nourished, well-developed patient, in no apparent 

distress.


SKIN: Extensive ulceration/erosion of the plantar aspect of the left second toe.


HEAD: Atraumatic. Normocephalic. No temporal or scalp tenderness.


EYES: Pupils equal round and reactive. Extraocular motions intact. No scleral 

icterus. No injection or drainage. 


ENT: Nose without bleeding, purulent drainage or septal hematoma. Throat 

without erythema, tonsillar hypertrophy or exudate. Uvula midline. Airway 

patent.


NECK: Trachea midline. No JVD or lymphadenopathy. Supple, nontender, no 

meningeal signs.


CARDIOVASCULAR: Regular rate and rhythm without murmurs, gallops, or rubs. 


RESPIRATORY: Clear to auscultation. Breath sounds equal bilaterally. No wheezes

, rales, or rhonchi.  


GASTROINTESTINAL: Abdomen soft, non-tender, nondistended. No hepato-splenomegaly

, or palpable masses. No guarding.


MUSCULOSKELETAL: Left second toe has extensive erosions/ulceration involving 

the plantar and medial aspect.  The toe is markedly swollen.  Apparent pocket 

of pus.


NEUROLOGICAL: Awake and alert. Cranial nerves II through XII intact.  Motor and 

sensory grossly within normal limits. Five out of 5 muscle strength in all 

muscle groups.  Normal speech.


Laboratory





Laboratory Tests








Test


  18


10:34


 


White Blood Count 9.7 


 


Red Blood Count 4.47 


 


Hemoglobin 14.6 


 


Hematocrit 40.8 


 


Mean Corpuscular Volume 91.1 


 


Mean Corpuscular Hemoglobin 32.6 


 


Mean Corpuscular Hemoglobin


Concent 35.8 


 


 


Red Cell Distribution Width 12.7 


 


Platelet Count 266 


 


Mean Platelet Volume 7.3 


 


Neutrophils (%) (Auto) 57.4 


 


Lymphocytes (%) (Auto) 33.3 


 


Monocytes (%) (Auto) 7.2 


 


Eosinophils (%) (Auto) 1.5 


 


Basophils (%) (Auto) 0.6 


 


Neutrophils # (Auto) 5.6 


 


Lymphocytes # (Auto) 3.2 


 


Monocytes # (Auto) 0.7 


 


Eosinophils # (Auto) 0.1 


 


Basophils # (Auto) 0.1 


 


CBC Comment DIFF FINAL 


 


Differential Comment  


 


Prothrombin Time 9.6 


 


Prothromb Time International


Ratio 0.9 


 


 


Activated Partial


Thromboplast Time 27.8 


 


 


Blood Urea Nitrogen 13 


 


Creatinine 0.73 


 


Random Glucose 301 


 


Calcium Level 9.0 


 


Sodium Level 131 


 


Potassium Level 4.7 


 


Chloride Level 95 


 


Carbon Dioxide Level 28.3 


 


Anion Gap 8 


 


Estimat Glomerular Filtration


Rate 109 


 








Result Diagram:  


18 1034                                                                   

             18 1034





Imaging





Last Impressions








Foot X-Ray 18 0000 Signed





Impressions: 





 Service Date/Time:  2018 10:22 - CONCLUSION:  1. Concern 





 for osteomyelitis with possible fracture involving the head of the proximal 





 phalanx of the second toe. No radiopaque foreign body.     Thomas Green Jr., MD Caprini VTE Risk Assessment


Caprini VTE Risk Assessment:  Mod/High Risk (score >= 2)


Caprini Risk Assessment Model











 Point Value = 1          Point Value = 2  Point Value = 3  Point Value = 5


 


Age 41-60


Minor surgery


BMI > 25 kg/m2


Swollen legs


Varicose veins


Pregnancy or postpartum


History of unexplained or recurrent


   spontaneous 


Oral contraceptives or hormone


   replacement


Sepsis (< 1 month)


Serious lung disease, including


   pneumonia (< 1 month)


Abnormal pulmonary function


Acute myocardial infarction


Congestive heart failure (< 1 month)


History of inflammatory bowel disease


Medical patient at bed rest Age 61-74


Arthroscopic surgery


Major open surgery (> 45 min)


Laparoscopic surgery (> 45 min)


Malignancy


Confined to bed (> 72 hours)


Immobilizing plaster cast


Central venous access Age >= 75


History of VTE


Family history of VTE


Factor V Leiden


Prothrombin 10681D


Lupus anticoagulant


Anticardiolipin antibodies


Elevated serum homocysteine


Heparin-induced thrombocytopenia


Other congenital or acquired


   thrombophilia Stroke (< 1 month)


Elective arthroplasty


Hip, pelvis, or leg fracture


Acute spinal cord injury (< 1 month)








Prophylaxis Regimen











   Total Risk


Factor Score Risk Level Prophylaxis Regimen


 


0-1      Low Early ambulation


 


2 Moderate Order ONE of the following:


*Sequential Compression Device (SCD)


*Heparin 5000 units SQ BID


 


3-4 Higher Order ONE of the following medications:


*Heparin 5000 units SQ TID


*Enoxaparin/Lovenox 40 mg SQ daily (WT < 150 kg, CrCl > 30 mL/min)


*Enoxaparin/Lovenox 30 mg SQ daily (WT < 150 kg, CrCl > 10-29 mL/min)


*Enoxaparin/Lovenox 30 mg SQ BID (WT < 150 kg, CrCl > 30 mL/min)


AND/OR


*Sequential Compression Device (SCD)


 


5 or more Highest Order ONE of the following medications:


*Heparin 5000 units SQ TID (Preferred with Epidurals)


*Enoxaparin/Lovenox 40 mg SQ daily (WT < 150 kg, CrCl > 30 mL/min)


*Enoxaparin/Lovenox 30 mg SQ daily (WT < 150 kg, CrCl > 10-29 mL/min)


*Enoxaparin/Lovenox 30 mg SQ BID (WT < 150 kg, CrCl > 30 mL/min)


AND


*Sequential Compression Device (SCD)











Assessment and Plan


Assessment and Plan


60-year-old male with past medical history significant for  HTN, DM, neuropathy

, and MI presents to the ED with worsening infection of the left second toe.





Diabetic foot infection/osteomyelitis left second toe:


-Will need to be debrided.  Consult podiatry for assistance


- Consult infectious disease for assistance with antibiotics management.  

Patient received vancomycin and Zosyn in the ED.


-Pain control as needed.





Diabetes II


- Levemir 30 units subcutaneous at bedtime


- Accu-Cheks with NovoLog ISS


- Hold off on metformin


- Hemoglobin A1c of 10.4 at the last admission.


- 1800 ADA diet





Neuropathy


   - Chronic, continue patient's Neurontin 600 mg 3 times a day





Hypertension, uncontrolled


-Continue lisinopril.  Continue to monitor trend





GI prophylaxis: Stool softener PRN constipation. 





DVT PPx: Heparin


Code Status


Full


Discussed Condition With


ER physician, Dr. Almeida.





Physician Certification


2 Midnight Certification Type:  Admission for Inpatient Services


Order for Inpatient Services


The services are ordered in accordance with Medicare regulations or non-

Medicare payer requirements, as applicable.  In the case of services not 

specified as inpatient-only, they are appropriately provided as inpatient 

services in accordance with the 2-midnight benchmark.


Estimated LOS (days):  3


 days is the estimated time the patient will need to remain in the hospital, 

assuming treatment plan goals are met and no additional complications.


Post-Hospital Plan:  Home











Davida Meneses MD 2018 15:44

## 2018-02-13 NOTE — PD
HPI


Chief Complaint:  Skin Problem


Time Seen by Provider:  09:53


Travel History


International Travel<30 days:  No


Contact w/Intl Traveler<30days:  No


Traveled to known affect area:  No





History of Present Illness


HPI


This patient complains of recurrence of his diabetic foot infection.  He was 

hospitalized previously for infection the left second toe.  He initially 

stepped on a nail and that psych at injured and then infected.  He had podiatry 

debridement in the hospital twice.  Recently has flared up worse.  He denies 

fever.  He was moderately severe.  He has diabetic neuropathy but still 

complaining of some pain there.  No alleviating factors.  No exacerbating 

factors.  Duration 2 weeks





PFSH


Past Medical History


Cancer:  No


Cardiovascular Problems:  Yes


Diabetes:  Yes


Patient Takes Glucophage:  Yes


Diminished Hearing:  No


Genitourinary:  No


Hypertension:  Yes


Immune Disorder:  No


Implanted Vascular Access Dvce:  Yes


Musculoskeletal:  No


Neurologic:  No


Psychiatric:  No


Reproductive:  No


Respiratory:  No


Pregnant?:  Not Pregnant





Past Surgical History


Appendectomy:  Yes


Body Medical Devices:  STENTS


Cardiac Surgery:  Yes (STENTS PLACED, CARDIAC CATH)


Other Surgery:  Yes





Social History


Alcohol Use:  No


Tobacco Use:  No


Substance Use:  No





Allergies-Medications


(Allergen,Severity, Reaction):  


Coded Allergies:  


     No Known Allergies (Unverified  Allergy, Unknown, 1/5/18)


Reported Meds & Prescriptions





Reported Meds & Active Scripts


Active


Novolog Inj (Insulin Aspart) 1,000 Unit/10 Ml Vial 2-12 Units SQ ACHS


     Max dose at bedtime ( 8) units; sugars less than 70,(0) units; sugars


     150-199,(2) units; sugars 200-249,(4) units; sugars 250-299,(7) units;


     sugars 300-349,(10) units; sugars greater than 349,(12)units


Lisinopril 10 Mg Tab 10 Mg PO DAILY


Lantus Inj (Insulin Glargine) 1,000 Unit/10 Ml Vial 40 Units SQ HS


Reported


Gabapentin 600 Mg Tab 600 Mg PO TID


Metformin (Metformin HCl) 1,000 Mg Tab 1,000 Mg PO BIDPC








Review of Systems


General / Constitutional:  No: Fever


Eyes:  No: Visual changes


HENT:  No: Headaches


Cardiovascular:  No: Chest Pain or Discomfort


Respiratory:  No: Shortness of Breath


Gastrointestinal:  No: Abdominal Pain


Genitourinary:  No: Dysuria


Musculoskeletal:  Positive: Pain


Skin:  No Rash


Neurologic:  No: Weakness


Psychiatric:  No: Depression


Endocrine:  No: Polydipsia


Hematologic/Lymphatic:  No: Easy Bruising





Physical Exam


Narrative


GENERAL: Well-nourished, well-developed patient with left foot infection .


SKIN: Focused skin assessment reveals no rash and nodules. Skin is Warm and dry.


HEAD: Atraumatic. Normocephalic. 


EYES: Pupils equal and round. No scleral icterus. No injection or drainage. 


ENT: No nasal bleeding or discharge.  Mucous membranes pink and moist.


NECK: Trachea midline. No JVD. 


CARDIOVASCULAR: Regular rate and rhythm.  No murmur appreciated.


RESPIRATORY: No accessory muscle use. Clear to auscultation. Breath sounds 

equal bilaterally. 


GASTROINTESTINAL: Abdomen soft, non-tender, nondistended. Hepatic and splenic 

margins not palpable. 


MUSCULOSKELETAL: Left second toe is erythematous and swollen and discolored.  

There is an open area but no active drainage.  Smells necrotic . No clubbing.  

No cyanosis.  No edema. 


NEUROLOGICAL: Awake and alert. No obvious cranial nerve deficits.  Motor 

grossly within normal limits. Normal speech.


PSYCHIATRIC: Appropriate mood and affect; insight and judgment normal.





Data


Data


Last Documented VS





Vital Signs








  Date Time  Temp Pulse Resp B/P (MAP) Pulse Ox O2 Delivery O2 Flow Rate FiO2


 


2/13/18 10:34  86 20 142/75 (97) 98 Room Air  


 


2/13/18 09:31 98.2       








Orders





 Orders


Iv Access Insert/Monitor (2/13/18 10:08)


Complete Blood Count With Diff (2/13/18 10:08)


Basic Metabolic Panel (Bmp) (2/13/18 10:08)


Prothrombin Time / Inr (Pt) (2/13/18 10:08)


Act Partial Throm Time (Ptt) (2/13/18 10:08)


Foot, Limited (2vws) (2/13/18 )


Vancomycin Inj (Vancomycin Inj) (2/13/18 10:15)


Piperacil-Tazo 3.375 Gm Premix (Zosyn 3. (2/13/18 10:15)


Clonidine (Catapres) (2/13/18 10:15)


Oxycodone-Acetamin 5-325 Mg (Percocet (2/13/18 11:45)


Admit To Inpatient (2/13/18 )


Vital Signs (Adult) Q4H (2/13/18 12:06)


Activity Oob Ad Lupe (2/13/18 12:06)


Intake + Output JANEEN.QSHIFT (2/13/18 12:06)


Diet 1800 Ada Cons Carb (2/13/18 Lunch)


Sodium Chloride 0.9% Flush (Ns Flush) (2/13/18 12:15)


Sodium Chloride 0.9% Flush (Ns Flush) (2/13/18 21:00)


Acetaminophen (Tylenol) (2/13/18 12:15)


Ondansetron Inj (Zofran Inj) (2/13/18 12:15)


Basic Metabolic Panel (Bmp) (2/14/18 06:00)


Complete Blood Count With Diff (2/14/18 06:00)


Heparin Inj (Heparin Inj) (2/13/18 12:15)


Naloxone Inj (Narcan Inj) (2/13/18 12:15)


Magnesium Hydroxide Liq (Milk Of Magnesi (2/13/18 12:15)


Sennosides (Senokot) (2/13/18 12:15)


Bisacodyl Supp (Dulcolax Supp) (2/13/18 12:15)


Lactulose Liq (Lactulose Liq) (2/13/18 12:15)


Inpatient Certification (2/13/18 )


Consult Podiatry (2/13/18 )


Consult Infectious Disease (2/13/18 )


(Hub Use Only)In Phy Cons/Ref (2/13/18 )


(Hub Use Only)In Phy Cons/Ref (2/13/18 )





Labs





Laboratory Tests








Test


  2/13/18


10:34


 


White Blood Count 9.7 TH/MM3 


 


Red Blood Count 4.47 MIL/MM3 


 


Hemoglobin 14.6 GM/DL 


 


Hematocrit 40.8 % 


 


Mean Corpuscular Volume 91.1 FL 


 


Mean Corpuscular Hemoglobin 32.6 PG 


 


Mean Corpuscular Hemoglobin


Concent 35.8 % 


 


 


Red Cell Distribution Width 12.7 % 


 


Platelet Count 266 TH/MM3 


 


Mean Platelet Volume 7.3 FL 


 


Neutrophils (%) (Auto) 57.4 % 


 


Lymphocytes (%) (Auto) 33.3 % 


 


Monocytes (%) (Auto) 7.2 % 


 


Eosinophils (%) (Auto) 1.5 % 


 


Basophils (%) (Auto) 0.6 % 


 


Neutrophils # (Auto) 5.6 TH/MM3 


 


Lymphocytes # (Auto) 3.2 TH/MM3 


 


Monocytes # (Auto) 0.7 TH/MM3 


 


Eosinophils # (Auto) 0.1 TH/MM3 


 


Basophils # (Auto) 0.1 TH/MM3 


 


CBC Comment DIFF FINAL 


 


Differential Comment  


 


Prothrombin Time 9.6 SEC 


 


Prothromb Time International


Ratio 0.9 RATIO 


 


 


Activated Partial


Thromboplast Time 27.8 SEC 


 


 


Blood Urea Nitrogen 13 MG/DL 


 


Creatinine 0.73 MG/DL 


 


Random Glucose 301 MG/DL 


 


Calcium Level 9.0 MG/DL 


 


Sodium Level 131 MEQ/L 


 


Potassium Level 4.7 MEQ/L 


 


Chloride Level 95 MEQ/L 


 


Carbon Dioxide Level 28.3 MEQ/L 


 


Anion Gap 8 MEQ/L 


 


Estimat Glomerular Filtration


Rate 109 ML/MIN 


 











MDM


Medical Decision Making


Medical Screen Exam Complete:  Yes


Emergency Medical Condition:  Yes


Medical Record Reviewed:  Yes


Differential Diagnosis


Diabetic foot infection, Charcot foot, abscess


Narrative Course


I have reviewed the patient's electronic medical record.  Patient was here 

previously for diabetic foot infection





1010: Patient evaluated and lab studies ordered an x-ray ordered


Nothing draining to culture at this time


Likely will require some incision and drainage and/or debridement


initiated vancomycin and zosyn therapy


Accu-Chek 301





11:00: Foot x-ray reveals fracture of the toe


Patients for osteomyelitis





11:30: Labs reviewed, not in DKA








12:00: Reviewed with hospitalist will admit for diabetic foot infection


2 pain pills given





Diagnosis





 Primary Impression:  


 Diabetic infection of left foot





Admitting Information


Admitting Physician Requests:  Admit











Evangelista Almeida MD Feb 13, 2018 10:20

## 2018-02-13 NOTE — RADRPT
EXAM DATE/TIME:  02/13/2018 10:22 

 

[facility] COMPARISON:     

No previous studies available for comparison.

 

                     

INDICATIONS :     

Left foot pain, stepped on nail two months ago.

                     

 

MEDICAL HISTORY :     

Diabetes mellitus type II.          

 

SURGICAL HISTORY :     

None.   

 

ENCOUNTER:     

Initial                                        

 

ACUITY:     

2 months      

 

PAIN SCORE:     

8/10

 

LOCATION:     

Left  foot, second digit

 

FINDINGS:     

2 views of the left foot reveal soft tissue swelling involving the second toe. There is cortical irre
gularity involving the head of the second metatarsal with slight reduction in density of that bony st
ructure. A questionable fracture through this region. Remaining bony structures are unremarkable. Ath
erosclerotic calcific changes throughout the foot.

 

CONCLUSION:     

1. Concern for osteomyelitis with possible fracture involving the head of the proximal phalanx of the
 second toe. No radiopaque foreign body.

 

 

 

 Armando Lopez Jr., MD on February 13, 2018 at 10:33           

Board Certified Radiologist.

 This report was verified electronically.

## 2018-02-13 NOTE — MB
cc:

NEGAR BONNER MD

****

 

 

DATE OF CONSULTATION

2/13/18

 

REQUESTING PHYSICIAN

Dr. Meneses

 

REASON FOR CONSULTATION

Questionable osteomyelitis of the left toe.  Assist with antibiotics.

 

HISTORY OF PRESENT ILLNESS

This is a 61-year-old  male who was recently discharged from this

hospital after being treated for cellulitis of his left foot.  The patient was

previously treated by podiatry for an injury when he stepped onto a nail.  He

has history of type 2 diabetes mellitus.  The patient had a culture taken from

the wound and it came back with Enterococcus faecalis and group B beta strep.

He had a bedside I&D performed on 01/08/2018.  The patient was discharged on

oral amoxicillin for 10 days and followed up with podiatry.  He saw podiatry

one-time.  His insurance  got canceled and he could not fill a prescription

cream that was ordered by podiatry.  The patient states that he was doing okay.

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

pain in his right great toe got pretty bad and the wound "split open."  He was

taking care of it himself at home and, after about a week, he noticed the toe

getting more painful and red and his wife convinced him to come to the

emergency department for evaluation.

 

The patient now has severe pain in his left foot.  The entire left second toe 
is 

red and there is an ulcer at the lateral aspect which abuts the third toe and 

on the plantar side of the second toe itself.  There is a purulent droplet of  
drainage.  

The patient notes that his wife had told him that there was a foul smell to the 
wound 

before he came to the emergency department.

 

He has no other complaints.  He does note that he may have had some fever, but

he did not take his temperature.  His temperature is currently normal.  His

white blood cell count is normal.  A plain x-ray of the  left foot shows a

possible fracture involving the head of the proximal phalanx of the second toe

and also concern for osteomyelitis.

 

PAST MEDICAL HISTORY

1.  Diabetes mellitus

2.  Diabetic foot infection.

3.  Hypertension,

4.  History of left shoulder repair,

5.  History of right knee repair,

6.  Appendectomy,

7.  Cardiac stents

8.  Cardiac catheterization.

 

ALLERGIES

NO KNOWN DRUG ALLERGIES.

 

MEDICATIONS

1.  Vancomycin one dose given.

2.  Piperacillin/tazobactam one dose given.

3.  Prinivil

4.  Levemir.

5.  Neurontin.

6.  Tylenol.

7.  Milk of magnesia,

8.  Dulcolax.

 

SOCIAL HISTORY

The patient is .  No tobacco, no alcohol, no illicit drugs.

 

FAMILY HISTORY

Noncontributory.

 

REVIEW OF SYSTEMS

Negative on 10-point review except for sharp, stabbing pain in the left foot in

the area of the left great toe.

 

PHYSICAL EXAMINATION

GENERAL:  This is a well-developed male who is in no acute distress.  He is

awake, alert and oriented.

VITAL SIGNS: Temperature 98.2, /71, respirations 18, heart rate 92.

HEENT:  Head atraumatic.  Extraocular movements grossly intact, pupils reactive

to light without icterus.  Oropharynx moist mucosa without visible lesions.

NECK:  Supple.  No adenopathy.  No swelling.

LUNGS:  Clear breath sounds bilateral.

HEART:  Regular S1 and S2.  No murmurs, rubs or gallops.  ABDOMEN:  Bowel

sounds present, soft, nontender.  No masses palpable.

RECTAL:  Not performed.

EXTREMITIES:  The left second toe is completely erythematous and there is an

ulceration with pale discoloration at the plantar aspect of the second toe and

wrapping around the area of the second toe which abuts the third.  There is

erythema at the base of the toes of the left foot.  This extends to about one

third of the foot at the location beyond the toes.  The right foot has no

clubbing, cyanosis or edema.

SKIN:   No rash.

NEUROLOGIC:  No gross focal findings.

PSYCHIATRIC: The patient is pleasant, calm and cooperative.

 

LABORATORY DATA

WBC 9.7, platelets 266, 57% neutrophils, 33% lymphocytes, hemoglobin 14.6.

 

Creatinine 0.73, BUN 13, sodium 131, random glucose 301.

 

IMPRESSION

1.  Diabetic foot infection of the left second toe.

2.  Cellulitis of the left foot.

3.  Osteomyelitis of the left second toe.

 

RECOMMENDATIONS

1.  Obtain culture from the wound.

2.  Surgical intervention to be determined by podiatry who  will be evaluating

the patient.

3.  Continue to treat with vancomycin and also with piperacillin/tazobactam

because of the cellulitis.

 

Thank you for this consultation.  I will follow the patient's progress and make

further recommendations on followup.

 

 

 

 

                              _________________________________

                              MD LAKSHMI Parish

D:  2/13/2018/5:01 PM

T:  2/13/2018/5:57 PM

Visit #:  [id number]

Job #:  [id number]

JUAN

## 2018-02-13 NOTE — MB
cc:

HILDA ORTA

****

 

 

DATE OF SURGERY

2/13/2018

 

CHIEF COMPLAINT

Left foot second digit infection.  The patient has been admitted for this in

the past where he was treated at bedside.

 

HISTORY OF PRESENT ILLNESS

This is a 60-year-old patient who has been treated for this infection in the

past which originally started after stepping on a nail. He was treated with an

bedside I&D and antibiotics by my partner Dr. Emily Ray but states that

he lost his insurance and was unable to follow up appropriately. He also has

not been controlling his blood glucoses. He states he has not been able to

follow up with his primary doctor either and has not been taking his diabetes

medications.  He denies any nausea, vomiting, fever, headaches, chills or pain

in the foot.

 

PAST MEDICAL HISTORY

1. Diabetes type 2.

2. Neuropathy.

3. Hypertension.

4. Gastroesophageal reflux disease.

5. Myocardial infarction with stenting x2.

 

PAST SURGICAL HISTORY

1. Left second digit toe debridement.

2. Right knee fracture with reconstruction.

3. Left knee meniscal removal.

4. Appendectomy.

5. Left shoulder surgery.

 

ALLERGIES

NO KNOWN DRUG ALLERGIES.

 

FAMILY HISTORY

Noncontributory.

 

SOCIAL HISTORY

The patient stopped smoking approximately 7 years ago.  Drinks alcohol

socially.  Denies any illicit drug abuse.

 

Vital signs, temperature is 98.2, pulse 82, respiratory rate 18, blood pressure

190/101, pulse ox is 98% O2 on room air.

 

LABORATORY DATA

White count 9.7, hemoglobin 14.6, hematocrit 40.8, platelets 266.

Coags, INR 0.9.

 

Sodium 131, potassium 4.7, chloride 95, carbon dioxide 28.3, BUN 13, random

glucose 301.

 

Wound cultures collected by the ED nurse are pending.

 

IMAGING

X-rays do show a fracture through the distal aspect of the proximal second

phalanx.  No gas in the soft tissues.  No cortical erosion. Some heavy

calcification noted in the posterior aspect and the anterior aspect of the

dorsal foot.

 

PHYSICAL EXAMINATION

EXTREMITIES: The patient has palpable pulses.  Cap fill time less than 3

seconds.  Gross sensation is severely diminished. Moderate  edema.  Second

digit ulceration around the base of the toe laterally extending to the plantar

aspect, full thickness fibrotic and necrotic in nature, approximately 1.5 x 1.0

cm with direct communication with the _____ of the proximal phalanx.

 

ASSESSMENT/PLAN

Cellulitis of the left second digit with suspected osteomyelitis.

 

Recommending amputation of the second digit.

 

N.p.o. after midnight.

 

Instructions left for the patient consent.

 

Continue IV antibiotics.

 

The patient has obtained insurance but has had a very difficult time finding a

primary care physician. We may need to have case management help so that he has

close followup and glucose management after his surgery.

 

Thank you for allowing me to be involved in this patient's care.

 

 

 

 

 

 

 

                              _________________________________

                              Hilda RAHMAN/KK

D:  2/13/2018/7:47 PM

T:  2/13/2018/10:05 PM

Visit #:  [id number]

Job #:  [id number]

## 2018-02-14 NOTE — HHI.PR
Subjective


Remarks


Patient reports he is feeling okay.  Will go to OR today for toe amputation.  

Pain is controlled.





Objective


Vitals





Vital Signs








  Date Time  Temp Pulse Resp B/P (MAP) Pulse Ox O2 Delivery O2 Flow Rate FiO2


 


2/14/18 12:36   18     


 


2/14/18 12:16     96 Room Air  


 


2/14/18 12:00 98.0 84 20 105/57 (73) 95   


 


2/14/18 08:00 98.2 88 16 115/58 (77) 96   


 


2/14/18 04:00 98.3 89 18 132/62 (85) 96   


 


2/14/18 02:00 99.6 96      


 


2/14/18 00:00 101.7 105 18 114/54 (74) 96   


 


2/13/18 20:00      Room Air  


 


2/13/18 20:00 99.3 95 16 143/76 (98) 97   


 


2/13/18 17:06        


 


2/13/18 14:26  92 17 136/71 (92) 99 Room Air  














I/O      


 


 2/13/18 2/13/18 2/13/18 2/14/18 2/14/18 2/14/18





 07:00 15:00 23:00 07:00 15:00 23:00


 


Intake Total  300 ml  855 ml  


 


Balance  300 ml  855 ml  


 


      


 


Intake Oral    240 ml  


 


IV Total  300 ml  615 ml  


 


# Voids    3  


 


# Bowel Movements    0  








Result Diagram:  


2/14/18 0740                                                                   

             2/14/18 0748





Objective Remarks


GENERAL: This is a well-nourished, well-developed patient, in no apparent 

distress.


SKIN: Extensive ulceration/erosion of the plantar aspect of the left second toe.


CARDIOVASCULAR: Regular rate and rhythm without murmurs, gallops, or rubs. 


RESPIRATORY: Clear to auscultation. Breath sounds equal bilaterally. No wheezes

, rales, or rhonchi.  


GASTROINTESTINAL: Abdomen soft, non-tender, nondistended. No hepato-splenomegaly

, or palpable masses. No guarding.


MUSCULOSKELETAL: Left second toe has extensive erosions/ulceration involving 

the plantar and medial aspect.  The toe is markedly swollen.  Apparent pocket 

of pus.





A/P


Assessment and Plan


60-year-old male with past medical history significant for HTN, DM, neuropathy, 

and MI presents to the ED with worsening infection of the left second toe.





Diabetic foot infection/osteomyelitis left second toe:


-Podiatry following.  Plan for amputation today.


-Infectious disease following.  Continue antibiotics with vancomycin and Zosyn.


-Pain control as needed.





Diabetes II


- Levemir 30 units subcutaneous at bedtime


- Accu-Cheks with NovoLog ISS


- Hold off on metformin


- Hemoglobin A1c of 10.4 at the last admission.


- 1800 ADA diet





Neuropathy


   - Chronic, continue patient's Neurontin 600 mg 3 times a day





Hypertension, uncontrolled


-Continue lisinopril.  Continue to monitor trend





GI prophylaxis: Stool softener PRN constipation. 





DVT PPx: Heparin











Davida Meneses MD Feb 14, 2018 13:50

## 2018-02-14 NOTE — EKG
Date Performed: 02/14/2018       Time Performed: 04:42:10

 

PTAGE:      61 years

 

EKG:      Sinus rhythm 

 

 Inferior infarct - age undetermined Possible anterior infarct - age undetermined Abnormal ECG 

 

NO PREVIOUS TRACING            

 

DOCTOR:   Phill Guardado  Interpretating Date/Time  02/14/2018 21:04:04

## 2018-02-14 NOTE — HHI.IDPN
Note


Infectious Disease Note








Patient without complaints.


Afebrile. 


Had temp last night.





Wound culture pending. 








PAST MEDICAL HISTORY


1.  Diabetes mellitus


2.  Diabetic foot infection.


3.  Hypertension,


4.  History of left shoulder repair,


5.  History of right knee repair,


6.  Appendectomy,


7.  Cardiac stents


8.  Cardiac catheterization.


 


ALLERGIES


NO KNOWN DRUG ALLERGIES.


 


MEDICATIONS


1.  Vancomycin.


2.  Piperacillin/tazobactam.





 





 


OBJECTIVE:





Vital Signs








  Date Time  Temp Pulse Resp B/P (MAP) Pulse Ox O2 Delivery O2 Flow Rate FiO2


 


2/14/18 12:36   18     


 


2/14/18 12:16     96 Room Air  


 


2/14/18 12:00 98.0 84 20 105/57 (73) 95   


 


2/14/18 08:00 98.2 88 16 115/58 (77) 96   


 


2/14/18 04:00 98.3 89 18 132/62 (85) 96   


 


2/14/18 02:00 99.6 96      


 


2/14/18 00:00 101.7 105 18 114/54 (74) 96   


 


2/13/18 20:00      Room Air  


 


2/13/18 20:00 99.3 95 16 143/76 (98) 97   


 


2/13/18 17:06        








 


Laboratory Tests








Test


  2/13/18


10:34 2/14/18


07:40


 


White Blood Count 9.7 TH/MM3  9.9 TH/MM3 


 


Red Blood Count 4.47 MIL/MM3  4.10 MIL/MM3 


 


Hemoglobin 14.6 GM/DL  13.3 GM/DL 


 


Hematocrit 40.8 %  36.7 % 


 


Mean Corpuscular Volume 91.1 FL  89.4 FL 


 


Mean Corpuscular Hemoglobin 32.6 PG  32.5 PG 


 


Mean Corpuscular Hemoglobin


Concent 35.8 % 


  36.4 % 


 


 


Red Cell Distribution Width 12.7 %  13.0 % 


 


Platelet Count 266 TH/MM3  250 TH/MM3 


 


Mean Platelet Volume 7.3 FL  7.4 FL 


 


Neutrophils (%) (Auto) 57.4 %  78.1 % 


 


Lymphocytes (%) (Auto) 33.3 %  11.7 % 


 


Monocytes (%) (Auto) 7.2 %  5.3 % 


 


Eosinophils (%) (Auto) 1.5 %  4.5 % 


 


Basophils (%) (Auto) 0.6 %  0.4 % 


 


Neutrophils # (Auto) 5.6 TH/MM3  7.7 TH/MM3 


 


Lymphocytes # (Auto) 3.2 TH/MM3  1.1 TH/MM3 


 


Monocytes # (Auto) 0.7 TH/MM3  0.5 TH/MM3 


 


Eosinophils # (Auto) 0.1 TH/MM3  0.4 TH/MM3 


 


Basophils # (Auto) 0.1 TH/MM3  0.0 TH/MM3 


 


CBC Comment DIFF FINAL  AUTO DIFF 


 


Differential Comment


   


  AUTO DIFF


CONFIRMED








Laboratory Tests








Test


  2/13/18


10:34 2/14/18


07:48


 


Blood Urea Nitrogen 13 MG/DL  17 MG/DL 


 


Creatinine 0.73 MG/DL  0.76 MG/DL 


 


Random Glucose 301 MG/DL  196 MG/DL 


 


Calcium Level 9.0 MG/DL  8.9 MG/DL 


 


Sodium Level 131 MEQ/L  132 MEQ/L 


 


Potassium Level 4.7 MEQ/L  3.7 MEQ/L 


 


Chloride Level 95 MEQ/L  97 MEQ/L 


 


Carbon Dioxide Level 28.3 MEQ/L  26.3 MEQ/L 


 


Anion Gap 8 MEQ/L  9 MEQ/L 


 


Estimat Glomerular Filtration


Rate 109 ML/MIN 


  104 ML/MIN 


 








Microbiology








 Date/Time


Source Procedure


Growth Status


 


 


 2/13/18 17:00


Wound Toe Gram Stain - Final Resulted


 


 2/13/18 17:00


Wound Toe Wound Culture


Pending Resulted











IMAGING:











Foot X-Ray 2/13/18 0000 Signed





Impressions: 





 Service Date/Time:  Tuesday, February 13, 2018 10:22 - CONCLUSION:  1. Concern 





 for osteomyelitis with possible fracture involving the head of the proximal 





 phalanx of the second toe. No radiopaque foreign body.     Armando Lopez Jr., MD 








PHYSICAL EXAMINATION


GENERAL:  No acute distress.  He is awake, alert and oriented.





HEENT:  Head atraumatic.  Extraocular movements grossly intact, pupils reactive


to light without icterus.  Oropharynx moist mucosa without visible lesions.


NECK:  Supple.  No adenopathy.  No swelling.


LUNGS:  Clear breath sounds bilateral.


HEART:  Regular S1 and S2.  No murmurs, rubs or gallops.  


ABDOMEN:  Bowel sounds present, soft, nontender. 


EXTREMITIES:  The left 2nd toe is less erythematous.  and there is an


ulceration with pale discoloration at the plantar aspect of the second toe. 


Erythema at the foot is decreased. 


SKIN:   No rash.


NEUROLOGIC:  No gross focal findings.


PSYCHIATRIC: The patient is pleasant, calm and cooperative.


 





 


IMPRESSION


1.  Diabetic foot infection of the left second toe.


2.  Cellulitis of the left foot.


3.  Osteomyelitis of the left second toe.


 


RECOMMENDATIONS


1.  Follow wound culture.


2.  Continue Vancomycin.


3.  Continue Piperacillin/tazobactam.





In light of the cellulitis I anticipate a course of antibiotics after surgery.











En Crane MD Feb 14, 2018 15:18

## 2018-02-14 NOTE — RADRPT
EXAM DATE/TIME:  02/14/2018 19:37 

 

[facility] COMPARISON:     No previous studies available for comparison.

 

                     

INDICATIONS :     Left foot post op. Left second digit amputation. 

                     

MEDICAL HISTORY :     Diabetes mellitus type II.          

SURGICAL HISTORY :     None.   

ENCOUNTER:     Subsequent                                        

ACUITY:     1 day      

PAIN SCORE:     0/10

LOCATION:     Left  foot. 

 

FINDINGS:     

No definite fractures, or dislocations are identified.  No definite lytic or sclerotic lesion is seen
. The second digit is amputated at the level of the metatarsophalangeal joint.

 

CONCLUSION:         Unremarkable study except for postsurgical changes.

 

 JERSON Jennings MD on February 14, 2018 at 20:07           

Board Certified Radiologist.

 This report was verified electronically.

## 2018-02-14 NOTE — MP
cc:

HILDA ORTA

****

 

 

DATE OF SURGERY

February 14, 2018

 

SURGEON

Dr. Hilda Orta

 

ASSISTANT

Staff provided first assist.

 

PREOPERATIVE DIAGNOSIS

Left foot second digit osteomyelitis.

 

POSTOPERATIVE DIAGNOSIS

Left foot second digit osteomyelitis.

 

PROCEDURE PERFORMED

Left foot second digit toe amputation.

 

PATHOLOGY SENT

Left second toe.

 

ANESTHESIA

MAC with local.

 

INJECTABLES

10 cc of 0.5% Marcaine plain.

 

ESTIMATED BLOOD LOSS

10 mL

 

MATERIALS USED

3-0 Prolene

 

HEMOSTASIS

Anatomical dissection.

 

COMPLICATIONS

None.

 

INDICATIONS

Mr. Faria is a 61-year-old patient known to myself and my partner 

_______.  He has a history of left second digit infection.  The infection has

worsened and the wound has deepened to the level of the bone with a

questionable osteomyelitic versus traumatic fracture associated and

communicating with the wound.  I explained to the patient that the most

definite way to rid him of this wound infection would be an amputation as I

feel he is beyond the reach of IV antibiotics, especially since his blood sugar

is uncontrolled.  The patient is agreeable to this plan.  The consent was

signed.  The procedure was explained.  No guarantees were given.

 

PROCEDURE

Under mild sedation the patient was brought into the operating room, placed on

the operating table.  Following IV sedation a pneumatic ankle tourniquet was

applied.  Attention was directed to the left second digit where there was a

full thickness ulceration laterally and the erythema to the level of the MPJ.

10 cc of 0.5% Marcaine plain were injected just proximal to the second MPJ and

two linear longitudinal semi-elliptical incisions were created both medial and

lateral with the apex being just proximal to the MPJ.  They were deepened

through the skin and subcutaneous tissue to the level of the joint and the

digit was disarticulated at the PIPJ.  The toe was removed from the field in

toto and the area was flushed with copious amounts of sterile saline.  The

extensor and flexor tendons were severed away from the incision site.  The

metatarsal head appeared clean and healthy with cartilage intact and all

fibrotic or necrotic tissue was sharply removed.

 

The skin edges were remodeled to allow for adequate closure.  The area was

flushed with copious amounts of sterile saline and closed with minimal tension

using 3-0 Prolene.  Sterile dressing of Adaptic, 4x4s, cast padding and Ace

bandage were applied.

 

The patient tolerated the procedure and anesthesia well.  He will recover in

the PACU for a period time before being discharged home with written and oral

postoperative instructions.

 

                              _________________________________

                              Hilda HENRY

D:  2/14/2018/7:05 PM

T:  2/14/2018/10:13 PM

Visit #:  [id number]

Job #:  [id number]

## 2018-02-15 NOTE — HHI.PR
Subjective


Remarks


Patient reports he is feeling okay.  Pain is controlled.





Objective


Vitals





Vital Signs








  Date Time  Temp Pulse Resp B/P (MAP) Pulse Ox O2 Delivery O2 Flow Rate FiO2


 


2/15/18 09:33      Room Air  


 


2/15/18 08:00 99.1 99 20 136/63 (87) 94   


 


2/15/18 04:00 99.1 107 16 124/66 (85) 95   


 


2/15/18 00:00 98.3 105 18 133/65 (87) 95   


 


2/15/18 00:00   18 133/    


 


2/14/18 20:00  84 20 106/57 (73) 96 Nasal Cannula 4 


 


2/14/18 20:00 97.9 83 18 103/58 (73) 97   


 


2/14/18 19:45  79 16 95/54 (68) 93 Nasal Cannula 4 


 


2/14/18 19:30  80 14 93/50 (64) 93 Nasal Cannula 4 


 


2/14/18 19:15  84 14 100/53 (69) 93 Nasal Cannula 4 


 


2/14/18 19:13 98.7 86 16 118/57 (77) 95 Nasal Cannula 4 


 


2/14/18 16:00 98.3 82 22 196/52 (100) 95   


 


2/14/18 12:36   18     


 


2/14/18 12:16     96 Room Air  


 


2/14/18 12:00 98.0 84 20 105/57 (73) 95   














I/O      


 


 2/14/18 2/14/18 2/14/18 2/15/18 2/15/18 2/15/18





 07:00 15:00 23:00 07:00 15:00 23:00


 


Intake Total 855 ml  1050 ml 650 ml  


 


Output Total   10 ml 800 ml  


 


Balance 855 ml  1040 ml -150 ml  


 


      


 


Intake Oral 240 ml  0 ml   


 


IV Total 615 ml  1050 ml 650 ml  


 


Output Urine Total    800 ml  


 


Estimated Blood Loss   10 ml   


 


# Voids 3  1   


 


# Bowel Movements 0  0   








Result Diagram:  


2/14/18 0740                                                                   

             2/14/18 0748





Objective Remarks


GENERAL: This is a well-nourished, well-developed patient, in no apparent 

distress.


SKIN: Extensive ulceration/erosion of the plantar aspect of the left second toe.


CARDIOVASCULAR: Regular rate and rhythm without murmurs, gallops, or rubs. 


RESPIRATORY: Clear to auscultation. Breath sounds equal bilaterally. No wheezes

, rales, or rhonchi.  


GASTROINTESTINAL: Abdomen soft, non-tender, nondistended. No hepato-splenomegaly

, or palpable masses. No guarding.


MUSCULOSKELETAL: Status post left second toe amputation.  Postop dressing 

appear intact.  Patient is in a postop shoe.





A/P


Assessment and Plan


60-year-old male with past medical history significant for HTN, DM, neuropathy, 

and MI presents to the ED with worsening infection of the left second toe.





Diabetic foot infection/osteomyelitis left second toe:


-Podiatry following.  Status post left second toe amputation.  Postop day #1


-Infectious disease following.  Continue antibiotics with vancomycin and Zosyn.

  ID anticipating a course of antibiotics given cellulitis on presentation.


-Pain control as needed.





Diabetes II


- Levemir 30 units subcutaneous at bedtime


- Accu-Cheks with NovoLog ISS


- Hold off on metformin


- Hemoglobin A1c of 10.4 at the last admission.


- 1800 ADA diet





Neuropathy


   - Chronic, continue patient's Neurontin 600 mg 3 times a day





Hypertension, uncontrolled


-Continue lisinopril.  Continue to monitor trend





GI prophylaxis: Stool softener PRN constipation. 





DVT PPx: Heparin


Discharge Planning


Probable discharge tomorrow once cleared by podiatry.











Davida Meneses MD Feb 15, 2018 11:23

## 2018-02-16 NOTE — HHI.FF
__________________________________________________





Infusion Therapy


Location of Infusion Therapy:  [facility]fusion Order


Patient Information


Patient Weight


95.2 kg


Diagnosis:  


Coded Allergies:  


     No Known Allergies (Unverified  Allergy, Unknown, 1/5/18)





Administer Medication


Vancomycin


1750mg IV Q12 hours


Stop Treatment:  Mar 2, 2018





Additional Information


Venous access:  PICC Line


Additional Instructions





[x] Peripheral flush and dressing changes per protocol


[x] Implanted port and central :


* Implanted port: 10 ml Normal Saline followed by 5 ml Heparin 100 units/ml 

Heparin flush


   after each use and monthly to maintain.


   [] May leave port accessed during therapy.


   [] May leave peripheral site accessed for duration of therapy.





[x] If patient has SOB or respiratory distress, check oxygen saturation. If 

less than 90% or clinical signs of respiratory distress, administer oxygen at 2 

L/min. via nasal cannula and notify physician.





[x] Anaphylaxis/Reaction orders:


* Stop infusion.


* Keep IV line open with saline flush.


* Notify physician.


* Monitor vital signs every 15 minutes until symptoms resolve.


* Check Oxygen saturation; Oxygen at 2 L/min. via nasal cannula if less than 90%

 or clinical signs of respiratory distress.


* Administer diphenhydramine (Benadryl) 25 mg IV STAT, (unless patient has 

received as pre-med). May repeat once, if necessary.


* Solu-Cortef 250 mg IVP over 30-60 seconds, use 100 mg vials for each 

dissolution.


* Epinephrine (1mg/1 ml) 0.3 mg subcutaneously or IVP now with any signs of 

respiratory distress.


* Check with physician for new additional pre-med orders if patient is re-

challenged or re-treated.


[x] May remove PICC line when treatment complete, after confirming with 

Physician.


[x] If the patient is admitted to the hospital, the ED, or transferred via EVAC

, complete transfer form including medication reconciliation order sheet.





Laboratory Tests


Weekly Labs:  BMP, Vancomycin Trough


Additional Information


Vancomycin trough and BMP starting Monday 2/19/18.


Call lab results from 2/19 to Dr Crane at [phone]. Fax: [phone]  





Follow up with Dr Demi Munoz ID in 1 week.











En Crane MD Feb 16, 2018 12:14

## 2018-02-16 NOTE — HHI.FF
__________________________________________________





Infusion Therapy


Location of Infusion Therapy:  Ambulatory Infusion Therapy Order


Patient Information


Patient Weight


95.2 kg


Diagnosis:  


Coded Allergies:  


     No Known Allergies (Unverified  Allergy, Unknown, 1/5/18)





Administer Medication


Vancomycin


2300mg IV Q 24 hours


Stop Treatment:  Mar 2, 2018





Additional Information


Venous access:  PICC Line


Additional Instructions





[x] Peripheral flush and dressing changes per protocol


[x] Implanted port and central :


* Implanted port: 10 ml Normal Saline followed by 5 ml Heparin 100 units/ml 

Heparin flush


   after each use and monthly to maintain.


   [] May leave port accessed during therapy.


   [] May leave peripheral site accessed for duration of therapy.





[x] If patient has SOB or respiratory distress, check oxygen saturation. If 

less than 90% or clinical signs of respiratory distress, administer oxygen at 2 

L/min. via nasal cannula and notify physician.





[x] Anaphylaxis/Reaction orders:


* Stop infusion.


* Keep IV line open with saline flush.


* Notify physician.


* Monitor vital signs every 15 minutes until symptoms resolve.


* Check Oxygen saturation; Oxygen at 2 L/min. via nasal cannula if less than 90%

 or clinical signs of respiratory distress.


* Administer diphenhydramine (Benadryl) 25 mg IV STAT, (unless patient has 

received as pre-med). May repeat once, if necessary.


* Solu-Cortef 250 mg IVP over 30-60 seconds, use 100 mg vials for each 

dissolution.


* Epinephrine (1mg/1 ml) 0.3 mg subcutaneously or IVP now with any signs of 

respiratory distress.


* Check with physician for new additional pre-med orders if patient is re-

challenged or re-treated.


[x] May remove PICC line when treatment complete, after confirming with 

Physician.


[x] If the patient is admitted to the hospital, the ED, or transferred via EVAC

, complete transfer form including medication reconciliation order sheet.





Laboratory Tests


Weekly Labs:  BMP, Vancomycin Trough


Additional Information


Vancomycin trough and BMP on 2/19/18.











En Crane MD Feb 16, 2018 13:27

## 2018-02-16 NOTE — PD.POD
Subjective


Podiatric Problems


POD #2 left second toe amputation. Pt denies any pain n/v/f/h/c/sob. He feels 

ready for possible d/c today.


Pain score:  0





Past Med/Surg/Social History


Social History


Smoking Status:  Never Smoker





Objective


Vital Signs





Vital Signs








  Date Time  Temp Pulse Resp B/P (MAP) Pulse Ox O2 Delivery O2 Flow Rate FiO2


 


2/16/18 04:40 98.5 92 18 144/75 (98) 95   


 


2/15/18 23:45 98.3 80 18 125/61 (82) 95   


 


2/15/18 20:48 98.9 83 18 149/81 (103) 96   


 


2/15/18 19:00      Room Air  


 


2/15/18 16:00 98.1 87 20 145/71 (95) 97   


 


2/15/18 12:00 97.2 89 22 131/77 (95) 96   


 


2/15/18 09:33      Room Air  


 


2/15/18 08:00 99.1 99 20 136/63 (87) 94   








Coded Allergies:  


     No Known Allergies (Unverified  Allergy, Unknown, 1/5/18)





Physical Exam


Remarks


Left foot 2nd toe amputated, all sutures intact, skin edges are well coapted. 

No drainage, no erythema, no malodor.





Assessment & Plan


A/P


1) POD #2 left second toe amp


-surgical site is healing well, ok to d/c from podiatry standpoint


-keep dressings clean, dry, and intact. No HHC. 


-suggest 2 weeks PO broad spectrum abx at d/c


-case management previously consulted to help arrange f/u with PCP, as pt has 

been unable to find one with his new insurance and glucose levels are 

uncontrolled


-f/u with  in 1 week











Hilda Villagomez DPM Feb 16, 2018 07:59

## 2018-02-16 NOTE — HHI.DS
__________________________________________________





Discharge Summary


Admission Date


Feb 13, 2018 at 13:01


Discharge Date:  Feb 16, 2018


Admitting Diagnosis





diabetic foot infection





(1) Osteomyelitis of toe


ICD Code:  M86.9 - Osteomyelitis, unspecified


(2) Cellulitis


ICD Code:  L03.90 - Cellulitis, unspecified


(3) Diabetic infection of left foot


ICD Code:  E11.69 - Type 2 diabetes mellitus with other specified complication; 

L08.9 - Local infection of the skin and subcutaneous tissue, unspecified


Status:  Acute


(4) HTN (hypertension)


ICD Code:  I10 - Essential (primary) hypertension


(5) Diabetes


ICD Code:  E11.9 - Type 2 diabetes mellitus without complications


Procedures


Left second toe amputation


Brief History - From Admission


60-year-old male with a medical history significant for hypertension, diabetes, 

peripheral neuropathy presented to the emergency room with worsening infection 

of the left second toe.  The patient was recently admitted for infection of the 

toe after he stepped on a nail.  He underwent debridement and was treated with 

antibiotics.  He reports that he was doing okay at home and a couple of days 

ago while pushing a boat he felt a pop in the wound opened up.  Since then he 

has been experiencing significant pain.  He denies any fevers or chills.  

Workup in the emergency room revealed probable osteomyelitis involving the 

second toe on the left foot.


CBC/BMP:  


2/14/18 0740                                                                   

             2/16/18 0933





Significant Findings





Laboratory Tests








Test


  2/14/18


07:40 2/14/18


07:48 2/15/18


09:00 2/16/18


09:33


 


Red Blood Count


  4.10 MIL/MM3


(4.50-5.90) 


  


  


 


 


Hematocrit


  36.7 %


(39.0-51.0) 


  


  


 


 


Mean Corpuscular Hemoglobin


Concent 36.4 %


(32.0-36.0) 


  


  


 


 


Neutrophils (%) (Auto)


  78.1 %


(16.0-70.0) 


  


  


 


 


Eosinophils (%) (Auto)


  4.5 %


(0.0-4.0) 


  


  


 


 


Random Glucose


  


  196 MG/DL


() 


  


 


 


Sodium Level


  


  132 MEQ/L


(136-145) 


  


 


 


Chloride Level


  


  97 MEQ/L


() 


  


 


 


Vancomycin Level Trough


  


  


  11.7 MCG/ML


(5.0-10.0) 


 








Imaging





Last Impressions








Foot X-Ray 2/14/18 0000 Signed





Impressions: 





 Service Date/Time:  Wednesday, February 14, 2018 19:37 - CONCLUSION: 





 Unremarkable study except for postsurgical changes.   JERSON Jennings MD 








PE at Discharge


GENERAL: This is a well-nourished, well-developed patient, in no apparent 

distress.


SKIN: Extensive ulceration/erosion of the plantar aspect of the left second toe.


CARDIOVASCULAR: Regular rate and rhythm without murmurs, gallops, or rubs. 


RESPIRATORY: Clear to auscultation. Breath sounds equal bilaterally. No wheezes

, rales, or rhonchi.  


GASTROINTESTINAL: Abdomen soft, non-tender, nondistended. No guarding.


MUSCULOSKELETAL: Status post left second toe amputation.  Postop dressing 

appear intact.  Patient is in a postop shoe.


Pt update on day of discharge


Patient reports he is feeling well today.  Pain is controlled.  Anxious to go 

home.


Hospital Course


60-year-old male with past medical history significant for HTN, DM, neuropathy, 

and MI presents to the ED with worsening infection of the left second toe.  

Evaluation and treatment course detailed below:





Diabetic foot infection/osteomyelitis left second toe:


-Patient underwent left second toe amputation by podiatry.


-Infectious disease followed the patient.  He was treated with IV antibiotics.  

He is discharged on outpatient IV antibiotics per infectious disease 

recommendations.


- Patient to follow-up with podiatry outpatient.





Diabetes II


-Continue Levemir 30 units subcutaneous at bedtime


-Resume metformin on discharge.


- Hemoglobin A1c of 10.4 at the last admission.


- 1800 ADA diet





Neuropathy


   - Chronic, continue patient's Neurontin 600 mg 3 times a day





Hypertension:


-Continue lisinopril.


Pt Condition on Discharge:  Good


Discharge Disposition:  Discharge Home


Discharge Time:  <= 30 minutes


Discharge Instructions


DIET: Follow Instructions for:  Diabetic Diet


Activities you can perform:  Regular-No Restrictions, See Additionl Instruction


Other Activity Instructions:  


Per Podiatry


Follow up Referrals:  


Appointment for Follow Up - 1 Week with Dr Demi Munoz


Appointment for Follow Up


PCP Follow-up


Podiatry - 1 Week @ [facility] with Hilda Villagomez DPM





New Medications:  


Epinephrine Inj (Epinephrine Inj) 1 Mg/Ml (1 Ml) Inj


0.3 MG IV PUSH ONCE PRN for ALLERGIC REACTION, #1 VIAL





Epinephrine Inj (Epinephrine Inj) 1 Mg/Ml (1 Ml) Inj


0.3 MG SQ ONCE PRN for ALLERGIC REACTION, #1 VIAL


Give with any signs of respiratory distress.


Hydrocortisone Inj (Solu-Cortef Inj) 250 Mg/2 Ml Inj


250 MG IV PUSH ONCE PRN for ALLERGIC REACTION, #1 VIAL 0 Refills


Give over 30-60 seconds.


Vancomycin Inj (Vancomycin Inj) 10 Gram Inj


2300 MG IV DAILY for Infection, #14 VIAL





Hydrocodone/Acetaminophen (Hydrocodone-Acetamin 5-325 mg) 5 Mg-325 Mg Tablet


1 TAB PO Q4H PRN for PAIN GREATER THAN 5, #20





 


Continued Medications:  


Gabapentin (Gabapentin) 600 Mg Tab


600 MG PO TID, #90 TAB 0 Refills





Insulin Aspart Inj (Novolog Inj) 1,000 Unit/10 Ml Vial


2-12 UNITS SQ ACHS for Blood Sugar Management, #10 ML 0 Refills


Max dose at bedtime ( 8) units; sugars less than 70,(0) units; sugars


 150-199,(2) units; sugars 200-249,(4) units; sugars 250-299,(7) units;


 sugars 300-349,(10) units; sugars greater than 349,(12)units


Insulin Glargine Inj (Lantus Inj) 1,000 Unit/10 Ml Vial


40 UNITS SQ HS for Blood Sugar Management, #1 VIAL 0 Refills





Lisinopril (Lisinopril) 10 Mg Tab


10 MG PO DAILY for Blood Pressure Management, #31 TAB





Metformin (Metformin) 1,000 Mg Tab


1000 MG PO BIDPC for Blood Sugar Management, #60 TAB 0 Refills

















Davida Meneses MD Feb 16, 2018 12:46

## 2018-02-16 NOTE — HHI.IDPN
Note


Infectious Disease Note








Patient without complaints.


Afebrile. 





Post up resection of toe. 


Wound culture no growth. 








PAST MEDICAL HISTORY


1.  Diabetes mellitus


2.  Diabetic foot infection.


3.  Hypertension,


4.  History of left shoulder repair,


5.  History of right knee repair,


6.  Appendectomy,


7.  Cardiac stents


8.  Cardiac catheterization.


 


ALLERGIES


NO KNOWN DRUG ALLERGIES.


 


MEDICATIONS


1.  Vancomycin.


2.  Piperacillin/tazobactam.





 





 


OBJECTIVE:








Vital Signs








  Date Time  Temp Pulse Resp B/P (MAP) Pulse Ox O2 Delivery O2 Flow Rate FiO2


 


2/16/18 08:00      Room Air  


 


2/16/18 08:00 98.5 80 20 143/90 (107) 98   


 


2/16/18 04:40 98.5 92 18 144/75 (98) 95   


 


2/15/18 23:45 98.3 80 18 125/61 (82) 95   


 


2/15/18 20:48 98.9 83 18 149/81 (103) 96   


 


2/15/18 19:00      Room Air  


 


2/15/18 16:00 98.1 87 20 145/71 (95) 97   











Laboratory Tests








Test


  2/16/18


09:33


 


Creatinine 0.75 MG/DL 


 


Estimat Glomerular Filtration


Rate 106 ML/MIN 


 








Microbiology








 Date/Time


Source Procedure


Growth Status


 


 


 2/13/18 17:00


Wound Toe Gram Stain - Final Complete


 


 2/13/18 17:00


Wound Toe Wound Culture - Final


HEAVY GROWTH NORMAL SKIN MARILIN... Complete











IMAGING:











Foot X-Ray 2/13/18 0000 Signed





Impressions: 





 Service Date/Time:  Tuesday, February 13, 2018 10:22 - CONCLUSION:  1. Concern 





 for osteomyelitis with possible fracture involving the head of the proximal 





 phalanx of the second toe. No radiopaque foreign body.     Armando Lopez Jr., MD 








PHYSICAL EXAMINATION


GENERAL:  No acute distress.  He is awake, alert and oriented.


HEENT:  Head atraumatic.  Extraocular movements grossly intact, pupils reactive


to light without icterus.  Oropharynx moist mucosa without visible lesions.


NECK:  Supple.  No adenopathy.  No swelling.


LUNGS:  Clear breath sounds.


HEART:  Regular S1 and S2.  No murmurs, rubs or gallops.  


ABDOMEN:  Bowel sounds present, soft, nontender. 


EXTREMITIES:  The left 2nd toe is post amputation.


Erythema at the foot is decreased. 


SKIN:   No rash.


NEUROLOGIC:  No gross focal findings.


PSYCHIATRIC: Pleasant, calm and cooperative.


 





 


IMPRESSION


1.  Diabetic foot infection of the left second toe.


2.  Cellulitis of the left foot.


3.  Osteomyelitis of the left second toe. Post Resection. 


 


RECOMMENDATIONS


1.  Continue Vancomycin IV x 2 weeks. 


2.  Stop Piperacillin/tazobactam.


3. Arrangement for outpatient antibiotic. See orders.


PIC line ordered. 


4. Follow up with ID outpatient. 


Can be discharged after arrangements.  notified.











En Crane MD Feb 16, 2018 12:19

## 2018-02-16 NOTE — RADRPT
EXAM DATE/TIME:  02/16/2018 16:58 

 

[facility] COMPARISON:     

No previous studies available for comparison.

 

                     

INDICATIONS :     

Evaluate for central line placement. 

                     

 

MEDICAL HISTORY :     

None.          

 

SURGICAL HISTORY :     

None.   

 

ENCOUNTER:     

Initial                                        

 

ACUITY:     

1 day      

 

PAIN SCORE:     

0/10

 

LOCATION:      

chest 

 

FINDINGS:     

A single view of the chest demonstrates the lungs to be symmetrically aerated without evidence of mas
s, infiltrate or effusion.  The cardiomediastinal contours are unremarkable.  Osseous structures are 
intact some degenerative spurring in the dorsal spine. Right upper extremity PICC line with the tip p
rojecting over the central venous system. Degenerative changes are seen in both shoulders.

 

CONCLUSION:     

1. Right upper extremity PICC line with the tip projecting over the central venous system.

2. Lungs are clear.

3. Degenerative changes in both shoulders.

 

 

 

 Adama Celaya MD on February 16, 2018 at 17:39           

Board Certified Radiologist.

 This report was verified electronically.